# Patient Record
Sex: FEMALE | Race: OTHER | HISPANIC OR LATINO | Employment: FULL TIME | URBAN - METROPOLITAN AREA
[De-identification: names, ages, dates, MRNs, and addresses within clinical notes are randomized per-mention and may not be internally consistent; named-entity substitution may affect disease eponyms.]

---

## 2020-01-20 ENCOUNTER — OFFICE VISIT (OUTPATIENT)
Dept: FAMILY MEDICINE CLINIC | Facility: CLINIC | Age: 20
End: 2020-01-20
Payer: COMMERCIAL

## 2020-01-20 VITALS
TEMPERATURE: 98.2 F | SYSTOLIC BLOOD PRESSURE: 102 MMHG | WEIGHT: 157.4 LBS | RESPIRATION RATE: 16 BRPM | DIASTOLIC BLOOD PRESSURE: 70 MMHG | HEART RATE: 87 BPM | OXYGEN SATURATION: 98 % | HEIGHT: 60 IN | BODY MASS INDEX: 30.9 KG/M2

## 2020-01-20 DIAGNOSIS — Z00.00 ANNUAL PHYSICAL EXAM: Primary | ICD-10-CM

## 2020-01-20 DIAGNOSIS — H54.7 POOR VISION: ICD-10-CM

## 2020-01-20 DIAGNOSIS — J45.30 MILD PERSISTENT ASTHMA WITHOUT COMPLICATION: ICD-10-CM

## 2020-01-20 DIAGNOSIS — Z13.6 SCREENING FOR CARDIOVASCULAR CONDITION: ICD-10-CM

## 2020-01-20 DIAGNOSIS — E66.09 CLASS 1 OBESITY DUE TO EXCESS CALORIES WITHOUT SERIOUS COMORBIDITY WITH BODY MASS INDEX (BMI) OF 30.0 TO 30.9 IN ADULT: ICD-10-CM

## 2020-01-20 DIAGNOSIS — Z13.31 SCREENING FOR DEPRESSION: ICD-10-CM

## 2020-01-20 PROCEDURE — 3008F BODY MASS INDEX DOCD: CPT | Performed by: FAMILY MEDICINE

## 2020-01-20 PROCEDURE — 99202 OFFICE O/P NEW SF 15 MIN: CPT | Performed by: FAMILY MEDICINE

## 2020-01-20 RX ORDER — ALBUTEROL SULFATE 1.25 MG/3ML
1 SOLUTION RESPIRATORY (INHALATION) EVERY 6 HOURS PRN
Qty: 30 VIAL | Refills: 2 | Status: SHIPPED | OUTPATIENT
Start: 2020-01-20

## 2020-01-20 RX ORDER — FLUTICASONE PROPIONATE 110 UG/1
2 AEROSOL, METERED RESPIRATORY (INHALATION) 2 TIMES DAILY
Qty: 1 INHALER | Refills: 5 | Status: SHIPPED | OUTPATIENT
Start: 2020-01-20 | End: 2020-03-04

## 2020-01-20 RX ORDER — ALBUTEROL SULFATE 90 UG/1
2 AEROSOL, METERED RESPIRATORY (INHALATION) EVERY 6 HOURS PRN
COMMUNITY
End: 2020-01-20 | Stop reason: SDUPTHER

## 2020-01-20 RX ORDER — ALBUTEROL SULFATE 1.25 MG/3ML
1 SOLUTION RESPIRATORY (INHALATION) EVERY 6 HOURS PRN
COMMUNITY
End: 2020-01-20 | Stop reason: SDUPTHER

## 2020-01-20 NOTE — PROGRESS NOTES
[address] [facility]    NAME: Desi Jama  AGE: 20 y o  SEX: female  : 2000     DATE: 2020     Assessment and Plan:     Patient is an obese 19 yo F with untreated mild persistent asthma that is exercise-induced  Patient is already pre-treating herself prior to exercise with albuterol inhaler  Patient was started on Fluticasone; instructed her to rinse mouth after every use  Discussed about different lifestyle interventions to lose weight, including [place] Healthy Eating Plate, portion size, and eating earlier in the day  F/u on CMP and Lipid Panel     Problem List Items Addressed This Visit     None      Visit Diagnoses     Annual physical exam    -  Primary    Poor vision        Relevant Orders    Ambulatory referral to Ophthalmology    Mild persistent asthma without complication        Relevant Medications    albuterol (ACCUNEB) 1 25 MG/3ML nebulizer solution    Albuterol Sulfate (PROAIR RESPICLICK) 623 (90 Base) MCG/ACT AEPB    fluticasone (FLOVENT HFA) 110 MCG/ACT inhaler    Class 1 obesity due to excess calories without serious comorbidity with body mass index (BMI) of 30 0 to 30 9 in adult        Relevant Orders    Comprehensive metabolic panel    Lipid Panel with Direct LDL reflex          Immunizations and preventive care screenings were discussed with patient today  Appropriate education was printed on patient's after visit summary  Counseling:  · Dental Health: discussed importance of regular tooth brushing, flossing, and dental visits  BMI Counseling: Body mass index is 30 74 kg/m²  The BMI is above normal  Nutrition recommendations include decreasing portion sizes, encouraging healthy choices of fruits and vegetables, decreasing fast food intake, reducing intake of saturated and trans fat and reducing intake of cholesterol  Exercise recommendations include exercising 3-5 times per week              Chief Complaint:     Chief Complaint   Patient presents with   [facility] ,no concern,no food  or drug allergies  needs refills  History of Present Illness:     Adult Annual Physical   Patient here for a comprehensive physical exam     The New Patient is an obese 21 yo F with mild persistent asthma presenting for [facility]  Patient has no health concerns  No recent injury, hospitalizations, or health concerns; mainly wants her albuterol medication refill  Patient's asthma was diagnosed 10 years ago, during his one and only exacerbation requiring emergency breathing treatment  Since then, the patient reports her asthma has been under control with rescue albuterol inhaler alone, which she uses daily before exercising  Patient reports requiring albuterol at night weekly  Patient is allergic to her own pets (cats and 2 dogs), which she treats with Zyrtec and occasionally Benadryl at night time  Patient denied any recent emergency room visits or hospitalizations for asthma exacerbation  Patient has been concerned about her weight, and has started going to the gym after work with her boyfriend "at least 5x/week " Patient reports her diet is largely standard  food during the weekday at home and she eats out during the weekends  Patient understands is trying to cut out white rice from her diet  Patient does NOT eat breakfast, but stops eating after 8 PM      Patient denies drinking, smoking cigarettes, with an exception of smoking marijuana twice a week for "anxiety and low back pain " Patient is currently experiencing life stress through parent's divorce, "which should have happened 5 years ago," as per patient  Patient is usually does NOT get anxious or stressed out easily, and she denies depressive mood or anhedonia  Patient feels safe at work, home, and in her relationship with her current boyfriend       Patient's latest Gonorrhea/chlamydia screen few months ago was negative  Diet and Physical Activity  · Diet/Nutrition:  food, eat out on weekends  Cook at home  Planning on cutting down on rice  · Exercise: 5-7 times a week on average  Depression Screening  Mood - no depression or anhedonia   PHQ-9 Depression Screening    PHQ-9:    Frequency of the following problems over the past two weeks:       Little interest or pleasure in doing things:  0 - not at all  Feeling down, depressed, or hopeless:  0 - not at all  PHQ-2 Score:  0       General Health  · Sleep: gets 4-6 hours of sleep on average  · Hearing: normal - bilateral   · Vision: most recent eye exam >1 year ago and prescription glasses every 2 years   · Dental: no dental visits for >1 year  Have new dental insurance  Will have regular cleaning     /GYN Health  · Last menstrual period: 1/13/2020  · Irregular and cramping treated with OCPs  Patient states the menses is still irregular, but it is NOT impairing her daily functions  · Contraceptive method: oral contraceptives  · History of STDs?: no      Review of Systems:     Review of Systems   HENT: Negative for hearing loss  Respiratory: Negative for shortness of breath and wheezing  Cardiovascular: Negative for chest pain  Gastrointestinal: Negative for abdominal pain  Genitourinary: Positive for menstrual problem (irregular menses with cramping treated with OCP)  Musculoskeletal: Negative for back pain (self-treating with marijuana twice a week)  Neurological: Negative for numbness  Psychiatric/Behavioral: Positive for sleep disturbance (poor sleep due to boyfriend's snoring)  Negative for dysphoric mood  The patient is not nervous/anxious  All other systems reviewed and are negative       Past Medical History:     Past Medical History:   Diagnosis Date    Asthma       Past Surgical History:     Past Surgical History:   Procedure Laterality Date    WISDOM TOOTH EXTRACTION  09/2018      Social History:     Social History Socioeconomic History    Marital status: Single     Spouse name: None    Number of children: None    Years of education: None    Highest education level: 12th grade   Occupational History    None   Social Needs    Financial resource strain: Not hard at all   Venu-Osbaldo insecurity:     Worry: Never true     Inability: Never true   [employer] needs:     Medical: Patient refused     Non-medical: Patient refused   Tobacco Use    Smoking status: Never Smoker    Smokeless tobacco: Never Used   Substance and Sexual Activity    Alcohol use: Yes     Alcohol/week: 2 0 standard drinks     Types: 2 Glasses of wine per week     Frequency: 2-4 times a month     Drinks per session: 1 or 2    Drug use: Yes     Frequency: 2 0 times per week     Types: Marijuana     Comment: for back pain twice a wk   Sexual activity: Yes     Partners: Male     Birth control/protection: Condom Male, OCP   Lifestyle    Physical activity:     Days per week: 5 days     Minutes per session: 60 min    Stress:  To some extent   Relationships    Social connections:     Talks on phone: More than three times a week     Gets together: More than three times a week     Attends [employer] service: Never     Active member of club or organization: No     Attends meetings of clubs or organizations: Patient refused     Relationship status: Never     Intimate partner violence:     Fear of current or ex partner: Patient refused     Emotionally abused: Patient refused     Physically abused: Patient refused     Forced sexual activity: Patient refused   Other Topics Concern    None   Social History Narrative    None      Family History:     Family History   Problem Relation Age of Onset    Depression Mother     Mental illness Mother     Dementia Maternal Grandmother     Dementia Maternal Grandfather     Dementia Paternal Grandmother     Dementia Paternal Grandfather       Current Medications:     Current Outpatient Medications Medication Sig Dispense Refill    albuterol (ACCUNEB) 1 25 MG/3ML nebulizer solution Take 3 mL (1 25 mg total) by nebulization every 6 (six) hours as needed for wheezing 30 vial 2    Albuterol Sulfate (PROAIR RESPICLICK) 584 (90 Base) MCG/ACT AEPB Inhale 1 Inhaler every 6 (six) hours as needed (before exercise) 1 each 10    fluticasone (FLOVENT HFA) 110 MCG/ACT inhaler Inhale 2 puffs 2 (two) times a day Rinse mouth after use  1 Inhaler 5     No current facility-administered medications for this visit  Allergies:     No Known Allergies   Physical Exam:     /70 (BP Location: Left arm, Patient Position: Sitting, Cuff Size: Standard)   Pulse 87   Temp 98 2 °F (36 8 °C) (Tympanic)   Resp 16   Ht 5' (1 524 m)   Wt 71 4 kg (157 lb 6 4 oz)   SpO2 98%   BMI 30 74 kg/m²     Physical Exam   Constitutional: She is oriented to person, place, and time  She appears well-developed and well-nourished  No distress  HENT:   Right Ear: Tympanic membrane normal    Left Ear: Tympanic membrane normal    Mouth/Throat: Oropharynx is clear and moist  No oropharyngeal exudate  Eyes: Pupils are equal, round, and reactive to light  Conjunctivae are normal    Cardiovascular: Normal rate  Pulmonary/Chest: Effort normal and breath sounds normal  No respiratory distress  She has no wheezes  Lymphadenopathy:     She has no cervical adenopathy  Neurological: She is alert and oriented to person, place, and time  No cranial nerve deficit  She exhibits normal muscle tone  Skin: Skin is warm  She is not diaphoretic  Psychiatric: She has a normal mood and affect  Her behavior is normal    Vitals reviewed        Emmanuel Dolan MD   [address] [place] [address]

## 2020-01-20 NOTE — PATIENT INSTRUCTIONS

## 2020-01-27 ENCOUNTER — TELEPHONE (OUTPATIENT)
Dept: FAMILY MEDICINE CLINIC | Facility: CLINIC | Age: 20
End: 2020-01-27

## 2020-01-27 DIAGNOSIS — J45.30 MILD PERSISTENT ASTHMA WITHOUT COMPLICATION: Primary | ICD-10-CM

## 2020-01-27 RX ORDER — ALBUTEROL SULFATE 90 UG/1
2 AEROSOL, METERED RESPIRATORY (INHALATION) EVERY 6 HOURS PRN
Qty: 2 INHALER | Refills: 5 | Status: SHIPPED | OUTPATIENT
Start: 2020-01-27 | End: 2021-05-13 | Stop reason: SDUPTHER

## 2020-01-27 NOTE — TELEPHONE ENCOUNTER
Pt said pharmacy told her albuterol respiclick is not covered under plan and that she needs albuterol HFA

## 2020-03-04 DIAGNOSIS — J45.30 MILD PERSISTENT ASTHMA WITHOUT COMPLICATION: Primary | ICD-10-CM

## 2020-03-04 RX ORDER — DEXAMETHASONE 4 MG/1
TABLET ORAL
Qty: 12 INHALER | Refills: 5 | Status: SHIPPED | OUTPATIENT
Start: 2020-03-04 | End: 2021-05-13 | Stop reason: SDUPTHER

## 2020-05-08 ENCOUNTER — TELEMEDICINE (OUTPATIENT)
Dept: FAMILY MEDICINE CLINIC | Facility: CLINIC | Age: 20
End: 2020-05-08
Payer: COMMERCIAL

## 2020-05-08 DIAGNOSIS — R68.84 JAW PAIN: Primary | ICD-10-CM

## 2020-05-08 PROCEDURE — 99214 OFFICE O/P EST MOD 30 MIN: CPT | Performed by: FAMILY MEDICINE

## 2020-09-08 DIAGNOSIS — Z30.9 ENCOUNTER FOR CONTRACEPTIVE MANAGEMENT, UNSPECIFIED TYPE: Primary | ICD-10-CM

## 2020-10-06 DIAGNOSIS — Z30.9 ENCOUNTER FOR CONTRACEPTIVE MANAGEMENT, UNSPECIFIED TYPE: ICD-10-CM

## 2020-10-06 RX ORDER — NORETHINDRONE ACETATE AND ETHINYL ESTRADIOL, ETHINYL ESTRADIOL AND FERROUS FUMARATE 1MG-10(24)
KIT ORAL
Qty: 28 TABLET | Refills: 1 | Status: SHIPPED | OUTPATIENT
Start: 2020-10-06 | End: 2021-05-13 | Stop reason: SDUPTHER

## 2021-02-01 ENCOUNTER — TELEMEDICINE (OUTPATIENT)
Dept: FAMILY MEDICINE CLINIC | Facility: CLINIC | Age: 21
End: 2021-02-01

## 2021-02-01 DIAGNOSIS — F41.9 ANXIETY: Primary | ICD-10-CM

## 2021-02-01 PROCEDURE — 99202 OFFICE O/P NEW SF 15 MIN: CPT | Performed by: FAMILY MEDICINE

## 2021-02-01 NOTE — PROGRESS NOTES
Virtual Regular Visit      Assessment/Plan:    Problem List Items Addressed This Visit     None      Visit Diagnoses     Anxiety    -  Primary               Reason for visit is   Chief Complaint   Patient presents with    New Patient Visit     ANXIETY/SLEEP ISSUES        Encounter provider Anam Perez MD    Provider located at [address] [place] [address]      Recent Visits  No visits were found meeting these conditions  Showing recent visits within past 7 days and meeting all other requirements     Today's Visits  Date Type Provider Dept   02/01/21 Telemedicine Anam Perez MD [facility] [address] [facility] today's visits and meeting all other requirements     Future Appointments  No visits were found meeting these conditions  Showing future appointments within next 150 days and meeting all other requirements        The patient was identified by name and date of birth  Desi Jama was informed that this is a telemedicine visit and that the visit is being conducted through [facility] and patient was informed that this is not a secure, HIPAA-compliant platform  She agrees to proceed     My office door was closed  No one else was in the room  She acknowledged consent and understanding of privacy and security of the video platform  The patient has agreed to participate and understands they can discontinue the visit at any time  Patient is aware this is a billable service  Subjective  Desi Jama is a 23 y o  female w/ long Hx anxiety and strong FHx anxiety         HPI     Past Medical History:   Diagnosis Date    Asthma        Past Surgical History:   Procedure Laterality Date    WISDOM TOOTH EXTRACTION  09/2018       Current Outpatient Medications   Medication Sig Dispense Refill    albuterol (ACCUNEB) 1 25 MG/3ML nebulizer solution Take 3 mL (1 25 mg total) by nebulization every 6 (six) hours as needed for wheezing 30 vial 2    albuterol (VENTOLIN HFA) 90 mcg/act inhaler Inhale 2 puffs every 6 (six) hours as needed for wheezing 2 Inhaler 5    FLOVENT  MCG/ACT inhaler INHALE 2 PUFFS 2 (TWO) TIMES A DAY RINSE MOUTH AFTER USE  12 Inhaler 5    Lo Loestrin Fe 1 MG-10 MCG / 10 MCG TABS TAKE 1 TABLET BY MOUTH EVERY DAY 28 tablet 1     No current facility-administered medications for this visit  No Known Allergies    Review of Systems   Constitutional: Negative for fatigue, fever and unexpected weight change  HENT: Negative for congestion, sinus pain and sore throat  Eyes: Negative for visual disturbance  Respiratory: Negative for shortness of breath and wheezing  Cardiovascular: Negative for chest pain and palpitations  Gastrointestinal: Negative for abdominal pain, nausea and vomiting  Musculoskeletal: Negative  Negative for arthralgias and myalgias  Neurological: Negative for syncope, weakness and numbness  Psychiatric/Behavioral: Negative for confusion, dysphoric mood and suicidal ideas  The patient is nervous/anxious  OCD       Video Exam    There were no vitals filed for this visit  Physical Exam  Pulmonary:      Effort: Pulmonary effort is normal           I spent 15 minutes directly with the patient during this visit      VIRTUAL VISIT DISCLAIMER    Rosalino Daniel acknowledges that she has consented to an online visit or consultation  She understands that the online visit is based solely on information provided by her, and that, in the absence of a face-to-face physical evaluation by the physician, the diagnosis she receives is both limited and provisional in terms of accuracy and completeness  This is not intended to replace a full medical face-to-face evaluation by the physician  Rosalino Daniel understands and accepts these terms

## 2021-05-13 ENCOUNTER — OFFICE VISIT (OUTPATIENT)
Dept: FAMILY MEDICINE CLINIC | Facility: CLINIC | Age: 21
End: 2021-05-13
Payer: COMMERCIAL

## 2021-05-13 VITALS
WEIGHT: 157.8 LBS | BODY MASS INDEX: 30.98 KG/M2 | HEART RATE: 102 BPM | SYSTOLIC BLOOD PRESSURE: 110 MMHG | HEIGHT: 60 IN | DIASTOLIC BLOOD PRESSURE: 70 MMHG | OXYGEN SATURATION: 97 % | TEMPERATURE: 98.2 F

## 2021-05-13 DIAGNOSIS — Z00.00 WELLNESS EXAMINATION: ICD-10-CM

## 2021-05-13 DIAGNOSIS — J45.40 MODERATE PERSISTENT ASTHMA, UNSPECIFIED WHETHER COMPLICATED: Primary | ICD-10-CM

## 2021-05-13 DIAGNOSIS — Z30.41 ORAL CONTRACEPTIVE PILL SURVEILLANCE: ICD-10-CM

## 2021-05-13 DIAGNOSIS — J45.30 MILD PERSISTENT ASTHMA WITHOUT COMPLICATION: ICD-10-CM

## 2021-05-13 DIAGNOSIS — Z30.9 ENCOUNTER FOR CONTRACEPTIVE MANAGEMENT, UNSPECIFIED TYPE: ICD-10-CM

## 2021-05-13 PROCEDURE — 3725F SCREEN DEPRESSION PERFORMED: CPT | Performed by: FAMILY MEDICINE

## 2021-05-13 PROCEDURE — 99213 OFFICE O/P EST LOW 20 MIN: CPT | Performed by: FAMILY MEDICINE

## 2021-05-13 PROCEDURE — 99395 PREV VISIT EST AGE 18-39: CPT | Performed by: FAMILY MEDICINE

## 2021-05-13 RX ORDER — DEXAMETHASONE 4 MG/1
1 TABLET ORAL 2 TIMES DAILY
Qty: 12 G | Refills: 5 | Status: SHIPPED | OUTPATIENT
Start: 2021-05-13 | End: 2021-10-12

## 2021-05-13 RX ORDER — NORETHINDRONE ACETATE AND ETHINYL ESTRADIOL, ETHINYL ESTRADIOL AND FERROUS FUMARATE 1MG-10(24)
1 KIT ORAL DAILY
Qty: 28 TABLET | Refills: 3 | Status: SHIPPED | OUTPATIENT
Start: 2021-05-13 | End: 2021-08-03

## 2021-05-13 RX ORDER — ALBUTEROL SULFATE 90 UG/1
2 AEROSOL, METERED RESPIRATORY (INHALATION) EVERY 6 HOURS PRN
Qty: 18 G | Refills: 5 | Status: SHIPPED | OUTPATIENT
Start: 2021-05-13 | End: 2021-12-13

## 2021-05-13 NOTE — PROGRESS NOTES
HPI:  Kerire Rivera is a 23 y o  female here for her yearly health maintenance exam    Patient Active Problem List   Diagnosis    Jaw pain    Asthma     Past Medical History:   Diagnosis Date    Asthma        1  Advanced Directive: n     2  Durable Power of  for Healthcare: n     3  Social History:           Drug and alcohol History: n                  4  Immunizations up to date: y                 Lifestyle:                           Healthy Diet:y                          Alcohol Use:y                          Tobacco Use:n                          Regular exercise:y                          Weight concerns:n                               5  Over the past 2 weeks, how often have you been bothered by the following:              Little interest or pleasure in doing things:n              Felling down, depressed or hopeless:n       Current Outpatient Medications   Medication Sig Dispense Refill    albuterol (ACCUNEB) 1 25 MG/3ML nebulizer solution Take 3 mL (1 25 mg total) by nebulization every 6 (six) hours as needed for wheezing 30 vial 2    albuterol (VENTOLIN HFA) 90 mcg/act inhaler Inhale 2 puffs every 6 (six) hours as needed for wheezing 2 Inhaler 5    FLOVENT  MCG/ACT inhaler INHALE 2 PUFFS 2 (TWO) TIMES A DAY RINSE MOUTH AFTER USE  12 Inhaler 5    Lo Loestrin Fe 1 MG-10 MCG / 10 MCG TABS TAKE 1 TABLET BY MOUTH EVERY DAY 28 tablet 1     No current facility-administered medications for this visit        No Known Allergies  Immunization History   Administered Date(s) Administered    DTaP,unspecified 2000, 2000, 01/03/2000, 08/30/2000, 05/29/2003    HPV 08/23/2010    HPV Quadrivalent 09/08/2014, 04/02/2015    Hepatitis A 02/10/2006, 04/06/2007    Hepatitis B 03/11/2006, 04/06/2007, 07/08/2009, 08/08/2009    HiB [birth year], 08/30/2000    INFLUENZA 10/04/2019    IPV 2000, 2000, 01/03/2000, 05/29/2003    MMR 06/02/2000, 05/29/2003    Meningococcal MCV4, Unspecified 08/23/2010    Meningococcal MCV4P 10/06/2015    Pneumococcal Conjugate 13-Valent 02/10/2006    Pneumococcal Conjugate PCV 7 08/30/2000, 11/29/2000    Tdap 08/23/2010, 05/13/2017    Varicella 04/06/2007, 05/18/2007       Patient Care Team:  Nimco Ramos MD as PCP - General (Family Medicine)    Review of Systems   Constitutional: Negative for appetite change, chills, diaphoresis and fever  HENT: Negative for ear pain, rhinorrhea, sinus pressure and sore throat  Eyes: Negative for discharge, redness and itching  Respiratory: Negative for cough, shortness of breath and wheezing  Cardiovascular: Negative for chest pain and palpitations  Gastrointestinal: Negative for abdominal pain, diarrhea, nausea and vomiting  Physical Exam :  Physical Exam  Vitals signs and nursing note reviewed  Constitutional:       General: She is not in acute distress  Appearance: She is well-developed  She is not diaphoretic  HENT:      Right Ear: Tympanic membrane, ear canal and external ear normal  Tympanic membrane is not injected  Left Ear: Tympanic membrane, ear canal and external ear normal  Tympanic membrane is not injected  Nose: Nose normal    Eyes:      General:         Right eye: No discharge  Left eye: No discharge  Conjunctiva/sclera: Conjunctivae normal       Pupils: Pupils are equal, round, and reactive to light  Neck:      Musculoskeletal: Normal range of motion and neck supple  Thyroid: No thyromegaly  Cardiovascular:      Rate and Rhythm: Normal rate and regular rhythm  Heart sounds: Normal heart sounds  No murmur  Pulmonary:      Effort: Pulmonary effort is normal  No respiratory distress  Breath sounds: Normal breath sounds  No wheezing  Lymphadenopathy:      Cervical: No cervical adenopathy  Assessment and Plan:  1  Moderate persistent asthma, unspecified whether complicated     2   Oral contraceptive pill surveillance 3  Wellness examination         Health Maintenance Due   Topic Date Due    COVID-19 Vaccine (1) Never done    HIV Screening  Never done    Chlamydia Screening  Never done    BMI: Adult  Never done    Annual Physical  Never done    Cervical Cancer Screening  Never done

## 2021-05-13 NOTE — PROGRESS NOTES
[facility]        NAME: Riley Workman is a 23 y o  female  : 2000    MRN: [id number]  DATE: May 13, 2021  TIME: 9:32 AM    Assessment and Plan   Moderate persistent asthma, unspecified whether complicated [H00 13]  1  Moderate persistent asthma, unspecified whether complicated     2  Oral contraceptive pill surveillance     3  Wellness examination           Patient Instructions     Patient Instructions   See meds--sched PAP          Chief Complaint     Chief Complaint   Patient presents with    Physical Exam         History of Present Illness       Est care      Review of Systems   Review of Systems   Constitutional: Negative for fatigue, fever and unexpected weight change  HENT: Negative for congestion, sinus pain and sore throat  Eyes: Negative for visual disturbance  Respiratory: Negative for shortness of breath and wheezing  Cardiovascular: Negative for chest pain and palpitations  Gastrointestinal: Negative for abdominal pain, nausea and vomiting  Musculoskeletal: Negative  Negative for arthralgias and myalgias  Neurological: Negative for syncope, weakness and numbness  Psychiatric/Behavioral: Negative  Negative for confusion, dysphoric mood and suicidal ideas           Current Medications       Current Outpatient Medications:     albuterol (ACCUNEB) 1 25 MG/3ML nebulizer solution, Take 3 mL (1 25 mg total) by nebulization every 6 (six) hours as needed for wheezing, Disp: 30 vial, Rfl: 2    albuterol (VENTOLIN HFA) 90 mcg/act inhaler, Inhale 2 puffs every 6 (six) hours as needed for wheezing, Disp: 2 Inhaler, Rfl: 5    FLOVENT  MCG/ACT inhaler, INHALE 2 PUFFS 2 (TWO) TIMES A DAY RINSE MOUTH AFTER USE , Disp: 12 Inhaler, Rfl: 5    Lo Loestrin Fe 1 MG-10 MCG / 10 MCG TABS, TAKE 1 TABLET BY MOUTH EVERY DAY, Disp: 28 tablet, Rfl: 1    Current Allergies     Allergies as of 2021    (No Known Allergies)            The following portions of the patient's history were reviewed and updated as appropriate: allergies, current medications, past family history, past medical history, past social history, past surgical history and problem list      Past Medical History:   Diagnosis Date    Asthma        Past Surgical History:   Procedure Laterality Date    WISDOM TOOTH EXTRACTION  09/2018       Family History   Problem Relation Age of Onset    Depression Mother     Mental illness Mother     Asthma Mother     Dementia Maternal Grandmother     Dementia Maternal Grandfather     Dementia Paternal Grandmother     Dementia Paternal Grandfather     Asthma Paternal Aunt     Asthma Paternal Uncle          Medications have been verified  Objective   /70 (BP Location: Left arm, Patient Position: Sitting, Cuff Size: Standard)   Pulse 102   Temp 98 2 °F (36 8 °C) (Oral)   Ht 5' (1 524 m)   Wt 71 6 kg (157 lb 12 8 oz)   SpO2 97%   BMI 30 82 kg/m²        Physical Exam     Physical Exam  Constitutional:       Appearance: She is well-developed  HENT:      Right Ear: Ear canal normal  Tympanic membrane is not injected  Left Ear: Ear canal normal  Tympanic membrane is not injected  Nose: Nose normal    Eyes:      General:         Right eye: No discharge  Left eye: No discharge  Conjunctiva/sclera: Conjunctivae normal       Pupils: Pupils are equal, round, and reactive to light  Neck:      Musculoskeletal: Normal range of motion and neck supple  Thyroid: No thyromegaly  Cardiovascular:      Rate and Rhythm: Normal rate and regular rhythm  Heart sounds: Normal heart sounds  No murmur  Pulmonary:      Effort: Pulmonary effort is normal  No respiratory distress  Breath sounds: Normal breath sounds  No wheezing  Abdominal:      General: Bowel sounds are normal  There is no distension  Palpations: Abdomen is soft  Tenderness: There is no abdominal tenderness     Musculoskeletal: Normal range of motion  Lymphadenopathy:      Cervical: No cervical adenopathy  Skin:     General: Skin is warm and dry  Neurological:      Mental Status: She is alert and oriented to person, place, and time  She is not disoriented  Sensory: No sensory deficit  Gait: Gait normal       Deep Tendon Reflexes: Reflexes are normal and symmetric  Psychiatric:         Speech: Speech normal          Behavior: Behavior normal          Thought Content:  Thought content normal          Judgment: Judgment normal

## 2021-06-07 ENCOUNTER — ANNUAL EXAM (OUTPATIENT)
Dept: FAMILY MEDICINE CLINIC | Facility: CLINIC | Age: 21
End: 2021-06-07
Payer: COMMERCIAL

## 2021-06-07 VITALS
HEIGHT: 60 IN | TEMPERATURE: 100 F | HEART RATE: 91 BPM | DIASTOLIC BLOOD PRESSURE: 64 MMHG | WEIGHT: 159 LBS | SYSTOLIC BLOOD PRESSURE: 102 MMHG | BODY MASS INDEX: 31.22 KG/M2 | OXYGEN SATURATION: 97 %

## 2021-06-07 DIAGNOSIS — Z02.4 DRIVER'S PERMIT PHYSICAL EXAMINATION: ICD-10-CM

## 2021-06-07 DIAGNOSIS — Z01.419 ENCOUNTER FOR GYNECOLOGICAL EXAMINATION WITHOUT ABNORMAL FINDING: Primary | ICD-10-CM

## 2021-06-07 PROCEDURE — 1036F TOBACCO NON-USER: CPT | Performed by: NURSE PRACTITIONER

## 2021-06-07 PROCEDURE — 3008F BODY MASS INDEX DOCD: CPT | Performed by: NURSE PRACTITIONER

## 2021-06-07 PROCEDURE — S0612 ANNUAL GYNECOLOGICAL EXAMINA: HCPCS | Performed by: NURSE PRACTITIONER

## 2021-06-07 PROCEDURE — 99213 OFFICE O/P EST LOW 20 MIN: CPT | Performed by: NURSE PRACTITIONER

## 2021-06-07 NOTE — PROGRESS NOTES
[facility]        NAME: Helena Crouch is a 24 y o  female  : 2000    MRN: [id number]  DATE: 2021  TIME: 5:25 PM    Assessment and Plan   Encounter for gynecological examination without abnormal finding [[id number] [id number]]  1  Encounter for gynecological examination without abnormal finding     2  's permit physical examination           Patient Instructions     Patient Instructions   Pap collected and sent to lab  's permit formed completed and returned to patient  Chief Complaint     Chief Complaint   Patient presents with    Gynecologic Exam         History of Present Illness       Here for routine gyn exam  LMP 21  taking oral birth control  No problems or concerns  Review of Systems   Review of Systems   Constitutional: Negative for activity change, appetite change, chills, diaphoresis, fatigue, fever and unexpected weight change  Respiratory: Negative for chest tightness, shortness of breath and wheezing  Cardiovascular: Negative for chest pain and palpitations  Gastrointestinal: Negative for abdominal distention, abdominal pain, constipation, diarrhea and nausea  Genitourinary: Negative for difficulty urinating, dyspareunia, dysuria, flank pain, frequency, menstrual problem, pelvic pain, urgency, vaginal bleeding, vaginal discharge and vaginal pain  Neurological: Negative for dizziness, weakness and headaches  Psychiatric/Behavioral: Negative for behavioral problems, dysphoric mood, sleep disturbance and suicidal ideas  The patient is not nervous/anxious            Current Medications       Current Outpatient Medications:     albuterol (ACCUNEB) 1 25 MG/3ML nebulizer solution, Take 3 mL (1 25 mg total) by nebulization every 6 (six) hours as needed for wheezing, Disp: 30 vial, Rfl: 2    albuterol (Ventolin HFA) 90 mcg/act inhaler, Inhale 2 puffs every 6 (six) hours as needed for wheezing, Disp: 18 g, Rfl: 5    fluticasone (Flovent HFA) 110 MCG/ACT inhaler, Inhale 1 puff 2 (two) times a day Rinse mouth after use , Disp: 12 g, Rfl: 5    Norethin-Eth Estrad-Fe Biphas (Lo Loestrin Fe) 1 MG-10 MCG / 10 MCG TABS, Take 1 tablet by mouth daily, Disp: 28 tablet, Rfl: 3    Current Allergies     Allergies as of 06/07/2021    (No Known Allergies)            The following portions of the patient's history were reviewed and updated as appropriate: allergies, current medications, past family history, past medical history, past social history, past surgical history and problem list      Past Medical History:   Diagnosis Date    Asthma        Past Surgical History:   Procedure Laterality Date    WISDOM TOOTH EXTRACTION  09/2018       Family History   Problem Relation Age of Onset    Depression Mother     Mental illness Mother     Asthma Mother     Dementia Maternal Grandmother     Dementia Maternal Grandfather     Dementia Paternal Grandmother     Dementia Paternal Grandfather     Asthma Paternal Aunt     Asthma Paternal Uncle          Medications have been verified  Objective   /64   Pulse 91   Temp 100 °F (37 8 °C) (Tympanic)   Ht 5' (1 524 m)   Wt 72 1 kg (159 lb)   SpO2 97%   BMI 31 05 kg/m²        Physical Exam     Physical Exam  Vitals signs and nursing note reviewed  Constitutional:       General: She is not in acute distress  Appearance: Normal appearance  She is well-developed  She is not diaphoretic  HENT:      Head: Normocephalic  Eyes:      Extraocular Movements: Extraocular movements intact  Pupils: Pupils are equal, round, and reactive to light  Neck:      Musculoskeletal: Normal range of motion and neck supple  No neck rigidity or muscular tenderness  Thyroid: No thyroid mass or thyromegaly  Vascular: No carotid bruit  Trachea: No tracheal deviation  Comments: Thyroid: no nodules  Cardiovascular:      Rate and Rhythm: Normal rate and regular rhythm        Heart sounds: No murmur  No friction rub  No gallop  Pulmonary:      Effort: Pulmonary effort is normal  No respiratory distress  Breath sounds: Normal breath sounds  No wheezing or rales  Chest:      Chest wall: No tenderness  Abdominal:      General: Bowel sounds are normal  There is no distension  Palpations: Abdomen is soft  There is no mass  Tenderness: There is no abdominal tenderness  There is no guarding or rebound  Genitourinary:     General: Normal vulva  Labia:         Right: No rash, tenderness, lesion or injury  Left: No rash, tenderness, lesion or injury  Vagina: Normal  No signs of injury and foreign body  No vaginal discharge, erythema, tenderness or bleeding  Cervix: No cervical motion tenderness, discharge or friability  Uterus: Not deviated, not enlarged, not fixed and not tender  Adnexa:         Right: No mass, tenderness or fullness  Left: No mass, tenderness or fullness  Rectum: Normal    Musculoskeletal: Normal range of motion  General: No tenderness or deformity  Lymphadenopathy:      Cervical: No cervical adenopathy  Skin:     General: Skin is warm and dry  Neurological:      Mental Status: She is alert and oriented to person, place, and time  Psychiatric:         Mood and Affect: Mood normal          Behavior: Behavior normal          Thought Content: Thought content normal          Judgment: Judgment normal      Breasts: breasts appear normal, no suspicious masses, no skin or nipple changes or axillary nodes  BMI Counseling: Body mass index is 31 05 kg/m²  The BMI is above normal  Nutrition recommendations include decreasing portion sizes, moderation in carbohydrate intake and increasing intake of lean protein  Exercise recommendations include exercising 3-5 times per week

## 2021-06-09 LAB
CLINICAL INFO: NORMAL
CYTO CVX: NORMAL
DATE PREVIOUS BX: NORMAL
LMP START DATE: NORMAL
SL AMB PREV. PAP:: NORMAL
SPECIMEN SOURCE CVX/VAG CYTO: NORMAL

## 2021-06-10 ENCOUNTER — TELEPHONE (OUTPATIENT)
Dept: FAMILY MEDICINE CLINIC | Facility: CLINIC | Age: 21
End: 2021-06-10

## 2021-06-10 NOTE — TELEPHONE ENCOUNTER
----- Message from [address] [place] [address],[address] sent at 6/9/2021  3:35 PM EDT -----  Call patient   Normal Pap

## 2021-08-03 DIAGNOSIS — Z30.9 ENCOUNTER FOR CONTRACEPTIVE MANAGEMENT, UNSPECIFIED TYPE: ICD-10-CM

## 2021-08-03 RX ORDER — NORETHINDRONE ACETATE AND ETHINYL ESTRADIOL, ETHINYL ESTRADIOL AND FERROUS FUMARATE 1MG-10(24)
KIT ORAL
Qty: 84 TABLET | Refills: 1 | Status: SHIPPED | OUTPATIENT
Start: 2021-08-03 | End: 2022-03-11

## 2021-10-12 DIAGNOSIS — J45.30 MILD PERSISTENT ASTHMA WITHOUT COMPLICATION: ICD-10-CM

## 2021-10-12 RX ORDER — DEXAMETHASONE 4 MG/1
1 TABLET ORAL 2 TIMES DAILY
Qty: 12 G | Refills: 1 | Status: SHIPPED | OUTPATIENT
Start: 2021-10-12 | End: 2022-02-23 | Stop reason: SDUPTHER

## 2021-11-02 ENCOUNTER — OFFICE VISIT (OUTPATIENT)
Dept: OBGYN CLINIC | Facility: CLINIC | Age: 21
End: 2021-11-02
Payer: COMMERCIAL

## 2021-11-02 VITALS
SYSTOLIC BLOOD PRESSURE: 106 MMHG | BODY MASS INDEX: 30.67 KG/M2 | DIASTOLIC BLOOD PRESSURE: 74 MMHG | HEIGHT: 60 IN | WEIGHT: 156.2 LBS

## 2021-11-02 DIAGNOSIS — N92.6 IRREGULAR MENSTRUAL BLEEDING: Primary | ICD-10-CM

## 2021-11-02 DIAGNOSIS — Z11.3 SCREENING EXAMINATION FOR STD (SEXUALLY TRANSMITTED DISEASE): ICD-10-CM

## 2021-11-02 LAB — SL AMB POCT URINE HCG: NEGATIVE

## 2021-11-02 PROCEDURE — 99203 OFFICE O/P NEW LOW 30 MIN: CPT | Performed by: PHYSICIAN ASSISTANT

## 2021-11-02 PROCEDURE — 87491 CHLMYD TRACH DNA AMP PROBE: CPT | Performed by: PHYSICIAN ASSISTANT

## 2021-11-02 PROCEDURE — 81025 URINE PREGNANCY TEST: CPT | Performed by: PHYSICIAN ASSISTANT

## 2021-11-02 PROCEDURE — 87591 N.GONORRHOEAE DNA AMP PROB: CPT | Performed by: PHYSICIAN ASSISTANT

## 2021-11-02 RX ORDER — CETIRIZINE HYDROCHLORIDE 10 MG/1
10 TABLET, CHEWABLE ORAL DAILY
COMMUNITY

## 2021-11-03 LAB
C TRACH DNA SPEC QL NAA+PROBE: NEGATIVE
N GONORRHOEA DNA SPEC QL NAA+PROBE: NEGATIVE

## 2021-11-08 ENCOUNTER — ULTRASOUND (OUTPATIENT)
Dept: OBGYN CLINIC | Facility: CLINIC | Age: 21
End: 2021-11-08
Payer: COMMERCIAL

## 2021-11-08 DIAGNOSIS — N93.9 ABNORMAL UTERINE BLEEDING (AUB): ICD-10-CM

## 2021-11-08 PROCEDURE — 76856 US EXAM PELVIC COMPLETE: CPT | Performed by: OBSTETRICS & GYNECOLOGY

## 2021-12-09 DIAGNOSIS — J45.30 MILD PERSISTENT ASTHMA WITHOUT COMPLICATION: ICD-10-CM

## 2021-12-13 RX ORDER — ALBUTEROL SULFATE 90 UG/1
AEROSOL, METERED RESPIRATORY (INHALATION)
Qty: 18 G | Refills: 5 | Status: SHIPPED | OUTPATIENT
Start: 2021-12-13 | End: 2022-08-04

## 2022-01-19 ENCOUNTER — OFFICE VISIT (OUTPATIENT)
Dept: URGENT CARE | Facility: CLINIC | Age: 22
End: 2022-01-19
Payer: COMMERCIAL

## 2022-01-19 VITALS
SYSTOLIC BLOOD PRESSURE: 140 MMHG | DIASTOLIC BLOOD PRESSURE: 80 MMHG | OXYGEN SATURATION: 99 % | BODY MASS INDEX: 30.23 KG/M2 | HEART RATE: 96 BPM | WEIGHT: 154 LBS | TEMPERATURE: 99.7 F | HEIGHT: 60 IN | RESPIRATION RATE: 18 BRPM

## 2022-01-19 DIAGNOSIS — B34.9 VIRAL SYNDROME: Primary | ICD-10-CM

## 2022-01-19 PROCEDURE — 99213 OFFICE O/P EST LOW 20 MIN: CPT | Performed by: PHYSICIAN ASSISTANT

## 2022-01-19 RX ORDER — BENZONATATE 100 MG/1
100 CAPSULE ORAL 3 TIMES DAILY PRN
Qty: 20 CAPSULE | Refills: 0 | Status: SHIPPED | OUTPATIENT
Start: 2022-01-19 | End: 2022-04-15

## 2022-01-19 RX ORDER — FLUTICASONE PROPIONATE 50 MCG
1 SPRAY, SUSPENSION (ML) NASAL DAILY
Qty: 16 G | Refills: 0 | Status: SHIPPED | OUTPATIENT
Start: 2022-01-19 | End: 2022-04-15

## 2022-01-19 NOTE — PATIENT INSTRUCTIONS
Viral Syndrome   WHAT YOU NEED TO KNOW:   Viral syndrome is a term used for symptoms of an infection caused by a virus  Viruses are spread easily from person to person through the air and on shared items  DISCHARGE INSTRUCTIONS:   Call your local emergency number (911 in the [facility]) or have someone else call if:   · You have a seizure  · You cannot be woken  · You have chest pain or trouble breathing  Return to the emergency department if:   · You have a stiff neck, a bad headache, and sensitivity to light  · You feel weak, dizzy, or confused  · You stop urinating or urinate a lot less than usual     · You cough up blood or thick yellow or green mucus  · You have severe abdominal pain or your abdomen is larger than usual     Call your doctor if:   · Your symptoms do not get better with treatment or get worse after 3 days  · You have a rash or ear pain  · You have burning when you urinate  · You have questions or concerns about your condition or care  Medicines: You may  need any of the following:  · Acetaminophen  decreases pain and fever  It is available without a doctor's order  Ask how much to take and how often to take it  Follow directions  Read the labels of all other medicines you are using to see if they also contain acetaminophen, or ask your doctor or pharmacist  Acetaminophen can cause liver damage if not taken correctly  Do not use more than 4 grams (4,000 milligrams) total of acetaminophen in one day  · NSAIDs , such as ibuprofen, help decrease swelling, pain, and fever  NSAIDs can cause stomach bleeding or kidney problems in certain people  If you take blood thinner medicine, always ask your healthcare provider if NSAIDs are safe for you  Always read the medicine label and follow directions  · Cold medicine  helps decrease swelling, control a cough, and relieve chest or nasal congestion  · Saline nasal spray  helps decrease nasal congestion       · Take your medicine as directed  Contact your healthcare provider if you think your medicine is not helping or if you have side effects  Tell him of her if you are allergic to any medicine  Keep a list of the medicines, vitamins, and herbs you take  Include the amounts, and when and why you take them  Bring the list or the pill bottles to follow-up visits  Carry your medicine list with you in case of an emergency  Manage your symptoms:   · Drink liquids as directed to prevent dehydration  Ask how much liquid to drink each day and which liquids are best for you  Ask if you should drink an oral rehydration solution (ORS)  An ORS has the right amounts of water, salts, and sugar you need to replace body fluids  This may help prevent dehydration caused by vomiting or diarrhea  Do not drink liquids with caffeine  Liquids with caffeine can make dehydration worse  · Get plenty of rest to help your body heal   Take naps throughout the day  Ask your healthcare provider when you can return to work and your normal activities  · Use a cool mist humidifier to help you breathe easier  Ask your healthcare provider how to use a cool mist humidifier  · Eat honey or use cough drops for a sore throat  Cough drops are available without a doctor's order  Follow directions for taking cough drops  · Do not smoke or be close to anyone who is smoking  Nicotine and other chemicals in cigarettes and cigars can cause lung damage  Smoking can also delay healing  Ask your healthcare provider for information if you currently smoke and need help to quit  E-cigarettes or smokeless tobacco still contain nicotine  Talk to your healthcare provider before you use these products  Prevent the spread of germs:       · Wash your hands often  Wash your hands several times each day  Wash after you use the bathroom, change a child's diaper, and before you prepare or eat food  Use soap and water every time   Rub your soapy hands together, lacing your fingers  Wash the front and back of your hands, and in between your fingers  Use the fingers of one hand to scrub under the fingernails of the other hand  Wash for at least 20 seconds  Rinse with warm, running water for several seconds  Then dry your hands with a clean towel or paper towel  Use hand  that contains alcohol if soap and water are not available  Do not touch your eyes, nose, or mouth without washing your hands first          · Cover a sneeze or cough  Use a tissue that covers your mouth and nose  Throw the tissue away in a trash can right away  Use the bend of your arm if a tissue is not available  Wash your hands well with soap and water or use a hand   · Stay away from others while you are sick  Avoid crowds as much as possible  · Ask about vaccines you may need  Talk to your healthcare provider about your vaccine history  He or she will tell you which vaccines you need, and when to get them  ? Get the influenza (flu) vaccine as soon as recommended each year  The flu vaccine is available starting in September or October  Flu viruses change, so it is important to get a flu vaccine every year  ? Get the pneumonia vaccine if recommended  This vaccine is usually recommended every 5 years  Your provider will tell you when to get this vaccine, if needed  Follow up with your doctor as directed:  Write down your questions so you remember to ask them during your visits  © Copyright [employer] 2021 Information is for End User's use only and may not be sold, redistributed or otherwise used for commercial purposes  All illustrations and images included in CareNotes® are the copyrighted property of A D A M , Inc  or Cecilia Prescott  The above information is an  only  It is not intended as medical advice for individual conditions or treatments   Talk to your doctor, nurse or pharmacist before following any medical regimen to see if it is safe and effective for you

## 2022-01-19 NOTE — PROGRESS NOTES
[facility]        NAME: Severo Giovanni is a 24 y o  female  : 2000    MRN: [id number]  DATE: 2022  TIME: 11:33 AM    Assessment and Plan   Viral syndrome [B34 9]  1  Viral syndrome  fluticasone (FLONASE) 50 mcg/act nasal spray    benzonatate (TESSALON PERLES) 100 mg capsule         Patient Instructions       Follow up with PCP in 3-5 days  Proceed to  ER if symptoms worsen  Chief Complaint     Chief Complaint   Patient presents with    Sinusitis     nose and eyes swollen for 2 days    Wound Infection     on left nipple from body piercing that was done 2 years ago  A month ago started with pain and drainage  History of Present Illness       Patient is a 50-year-old female presents to the office complaining off his a congestion sinus pressure for the last 3 days  Patient has not had any fevers is tolerating p o  with no nausea vomiting diarrhea  Patient is not want a COVID testing at this time  Patient has some clear nasal discharge and congestion  Patient is also complaining of a irritation to her left nipple secondary to piercings  Patient states symptoms have intermittently been going on since summer of last year  URI   This is a new problem  Episode onset: 3 days  The problem has been unchanged  There has been no fever  Associated symptoms include congestion, a rash, rhinorrhea and sinus pain  Pertinent negatives include no abdominal pain, chest pain, coughing, diarrhea, dysuria, headaches, joint pain, joint swelling, nausea, neck pain, plugged ear sensation, sneezing, sore throat, swollen glands, vomiting or wheezing  Review of Systems   Review of Systems   HENT: Positive for congestion, rhinorrhea and sinus pain  Negative for sneezing and sore throat  Eyes: Negative  Respiratory: Negative  Negative for cough and wheezing  Cardiovascular: Negative  Negative for chest pain  Gastrointestinal: Negative    Negative for abdominal pain, diarrhea, nausea and vomiting  Endocrine: Negative  Genitourinary: Negative  Negative for dysuria  Musculoskeletal: Negative  Negative for joint pain and neck pain  Skin: Positive for rash  Allergic/Immunologic: Negative  Neurological: Negative  Negative for headaches  Hematological: Negative  Psychiatric/Behavioral: Negative  All other systems reviewed and are negative          Current Medications       Current Outpatient Medications:     albuterol (ACCUNEB) 1 25 MG/3ML nebulizer solution, Take 3 mL (1 25 mg total) by nebulization every 6 (six) hours as needed for wheezing, Disp: 30 vial, Rfl: 2    albuterol (PROVENTIL HFA,VENTOLIN HFA) 90 mcg/act inhaler, TAKE 2 PUFFS BY MOUTH EVERY 6 HOURS AS NEEDED FOR WHEEZE, Disp: 18 g, Rfl: 5    cetirizine (ZyrTEC) 10 MG chewable tablet, Chew 10 mg daily, Disp: , Rfl:     Flovent  MCG/ACT inhaler, INHALE 1 PUFF 2 (TWO) TIMES A DAY RINSE MOUTH AFTER USE , Disp: 12 g, Rfl: 1    Lo Loestrin Fe 1 MG-10 MCG / 10 MCG TABS, TAKE 1 TABLET BY MOUTH EVERY DAY, Disp: 84 tablet, Rfl: 1    benzonatate (TESSALON PERLES) 100 mg capsule, Take 1 capsule (100 mg total) by mouth 3 (three) times a day as needed for cough, Disp: 20 capsule, Rfl: 0    fluticasone (FLONASE) 50 mcg/act nasal spray, 1 spray into each nostril daily, Disp: 16 g, Rfl: 0    Current Allergies     Allergies as of 01/19/2022    (No Known Allergies)            The following portions of the patient's history were reviewed and updated as appropriate: allergies, current medications, past family history, past medical history, past social history, past surgical history and problem list      Past Medical History:   Diagnosis Date    Asthma        Past Surgical History:   Procedure Laterality Date    WISDOM TOOTH EXTRACTION  09/2018       Family History   Problem Relation Age of Onset    Depression Mother     Mental illness Mother     Asthma Mother     Dementia Maternal Grandmother     Dementia Maternal Grandfather     Dementia Paternal Grandmother     Dementia Paternal Grandfather     Asthma Paternal Aunt     Asthma Paternal Uncle          Medications have been verified  Objective   /80   Pulse 96   Temp 99 7 °F (37 6 °C)   Resp 18   Ht 5' (1 524 m)   Wt 69 9 kg (154 lb)   SpO2 99%   BMI 30 08 kg/m²   No LMP recorded  Physical Exam     Physical Exam  Vitals and nursing note reviewed  Constitutional:       General: She is not in acute distress  Appearance: Normal appearance  She is not ill-appearing, toxic-appearing or diaphoretic  HENT:      Head: Normocephalic  Right Ear: Tympanic membrane, ear canal and external ear normal  There is no impacted cerumen  Left Ear: Tympanic membrane, ear canal and external ear normal  There is no impacted cerumen  Nose: Congestion and rhinorrhea present  Mouth/Throat:      Mouth: Mucous membranes are moist       Pharynx: Oropharynx is clear  No oropharyngeal exudate or posterior oropharyngeal erythema  Eyes:      General: No scleral icterus  Right eye: No discharge  Left eye: No discharge  Extraocular Movements: Extraocular movements intact  Conjunctiva/sclera: Conjunctivae normal       Pupils: Pupils are equal, round, and reactive to light  Cardiovascular:      Rate and Rhythm: Normal rate  Pulses: Normal pulses  Heart sounds: No murmur heard  No friction rub  No gallop  Pulmonary:      Effort: Pulmonary effort is normal  No respiratory distress  Breath sounds: Normal breath sounds  No stridor  No wheezing, rhonchi or rales  Chest:      Chest wall: No tenderness  Abdominal:      General: Abdomen is flat  There is no distension  Palpations: There is no mass  Tenderness: There is no abdominal tenderness  There is no right CVA tenderness, left CVA tenderness, guarding or rebound  Hernia: No hernia is present     Musculoskeletal: General: Normal range of motion  Cervical back: Normal range of motion  Skin:     Capillary Refill: Capillary refill takes less than 2 seconds  Coloration: Skin is not jaundiced or pale  Findings: No bruising, erythema or lesion  Comments: Patient has bilateral areola/nipple piercings  No signs of infection induration or abscess formation  No discharge present  Neurological:      General: No focal deficit present  Mental Status: She is alert  Cranial Nerves: No cranial nerve deficit  Sensory: No sensory deficit  Motor: No weakness        Coordination: Coordination normal       Gait: Gait normal       Deep Tendon Reflexes: Reflexes normal

## 2022-02-23 ENCOUNTER — OFFICE VISIT (OUTPATIENT)
Dept: FAMILY MEDICINE CLINIC | Facility: CLINIC | Age: 22
End: 2022-02-23
Payer: COMMERCIAL

## 2022-02-23 DIAGNOSIS — F41.9 ANXIETY: Primary | ICD-10-CM

## 2022-02-23 DIAGNOSIS — J45.30 MILD PERSISTENT ASTHMA WITHOUT COMPLICATION: ICD-10-CM

## 2022-02-23 PROCEDURE — 99214 OFFICE O/P EST MOD 30 MIN: CPT | Performed by: FAMILY MEDICINE

## 2022-02-23 RX ORDER — DEXAMETHASONE 4 MG/1
2 TABLET ORAL 2 TIMES DAILY
Qty: 12 G | Refills: 5 | Status: SHIPPED | OUTPATIENT
Start: 2022-02-23

## 2022-02-23 NOTE — PROGRESS NOTES
Assessment/Plan:    No problem-specific Assessment & Plan notes found for this encounter  Diagnoses and all orders for this visit:    Anxiety    Mild persistent asthma without complication          Subjective:   No chief complaint on file  Patient ID: Severo Giovanni is a 24 y o  female  F/u asthma      The following portions of the patient's history were reviewed and updated as appropriate: allergies, current medications, past family history, past medical history, past social history, past surgical history and problem list     Review of Systems   Constitutional: Negative for fatigue, fever and unexpected weight change  HENT: Negative for congestion, sinus pain and sore throat  Eyes: Negative for visual disturbance  Respiratory: Negative for shortness of breath and wheezing  Cardiovascular: Negative for chest pain and palpitations  Gastrointestinal: Negative for abdominal pain, nausea and vomiting  Musculoskeletal: Negative  Negative for arthralgias and myalgias  Neurological: Negative for syncope, weakness and numbness  Psychiatric/Behavioral: Negative  Negative for confusion, dysphoric mood and suicidal ideas  Objective: There were no vitals filed for this visit  Physical Exam  Constitutional:       Appearance: She is well-developed  HENT:      Right Ear: Ear canal normal  Tympanic membrane is not injected  Left Ear: Ear canal normal  Tympanic membrane is not injected  Nose: Nose normal    Eyes:      General:         Right eye: No discharge  Left eye: No discharge  Conjunctiva/sclera: Conjunctivae normal       Pupils: Pupils are equal, round, and reactive to light  Neck:      Thyroid: No thyromegaly  Cardiovascular:      Rate and Rhythm: Normal rate and regular rhythm  Heart sounds: Normal heart sounds  No murmur heard  Pulmonary:      Effort: Pulmonary effort is normal  No respiratory distress        Breath sounds: Normal breath sounds  No wheezing  Abdominal:      General: Bowel sounds are normal  There is no distension  Palpations: Abdomen is soft  Tenderness: There is no abdominal tenderness  Musculoskeletal:         General: Normal range of motion  Cervical back: Normal range of motion and neck supple  Lymphadenopathy:      Cervical: No cervical adenopathy  Skin:     General: Skin is warm and dry  Neurological:      Mental Status: She is alert and oriented to person, place, and time  She is not disoriented  Sensory: No sensory deficit  Gait: Gait normal       Deep Tendon Reflexes: Reflexes are normal and symmetric  Psychiatric:         Speech: Speech normal          Behavior: Behavior normal          Thought Content:  Thought content normal          Judgment: Judgment normal

## 2022-03-11 DIAGNOSIS — Z30.9 ENCOUNTER FOR CONTRACEPTIVE MANAGEMENT, UNSPECIFIED TYPE: ICD-10-CM

## 2022-03-11 RX ORDER — NORETHINDRONE ACETATE AND ETHINYL ESTRADIOL, ETHINYL ESTRADIOL AND FERROUS FUMARATE 1MG-10(24)
KIT ORAL
Qty: 84 TABLET | Refills: 1 | Status: SHIPPED | OUTPATIENT
Start: 2022-03-11

## 2022-04-15 ENCOUNTER — OFFICE VISIT (OUTPATIENT)
Dept: FAMILY MEDICINE CLINIC | Facility: CLINIC | Age: 22
End: 2022-04-15
Payer: COMMERCIAL

## 2022-04-15 VITALS
WEIGHT: 154 LBS | BODY MASS INDEX: 30.23 KG/M2 | HEART RATE: 94 BPM | DIASTOLIC BLOOD PRESSURE: 76 MMHG | SYSTOLIC BLOOD PRESSURE: 118 MMHG | OXYGEN SATURATION: 97 % | RESPIRATION RATE: 14 BRPM | HEIGHT: 60 IN

## 2022-04-15 DIAGNOSIS — R23.8 EASY BRUISING: Primary | ICD-10-CM

## 2022-04-15 PROCEDURE — 3008F BODY MASS INDEX DOCD: CPT | Performed by: FAMILY MEDICINE

## 2022-04-15 PROCEDURE — 3725F SCREEN DEPRESSION PERFORMED: CPT | Performed by: FAMILY MEDICINE

## 2022-04-15 PROCEDURE — 1036F TOBACCO NON-USER: CPT | Performed by: FAMILY MEDICINE

## 2022-04-15 PROCEDURE — 99214 OFFICE O/P EST MOD 30 MIN: CPT | Performed by: FAMILY MEDICINE

## 2022-04-15 NOTE — PROGRESS NOTES
Assessment/Plan:    No problem-specific Assessment & Plan notes found for this encounter  Diagnoses and all orders for this visit:    Easy bruising  -     CBC and differential; Future  -     Comprehensive metabolic panel; Future  -     CBC and differential  -     Comprehensive metabolic panel          Subjective:   Chief Complaint   Patient presents with    Follow-up     Dark bruise on breast- now resolved        Patient ID: Travis Miller is a 24 y o  female  C/o bruise L breast--resolved      The following portions of the patient's history were reviewed and updated as appropriate: allergies, current medications, past family history, past medical history, past social history, past surgical history and problem list     Review of Systems   Constitutional: Negative for fatigue, fever and unexpected weight change  HENT: Negative for congestion, sinus pain and sore throat  Eyes: Negative for visual disturbance  Respiratory: Negative for shortness of breath and wheezing  Cardiovascular: Negative for chest pain and palpitations  Gastrointestinal: Negative for abdominal pain, nausea and vomiting  Musculoskeletal: Negative  Negative for arthralgias and myalgias  Neurological: Negative for syncope, weakness and numbness  Psychiatric/Behavioral: Negative  Negative for confusion, dysphoric mood and suicidal ideas  Objective:  Vitals:    04/15/22 1053   BP: 118/76   Pulse: 94   Resp: 14   SpO2: 97%   Weight: 69 9 kg (154 lb)   Height: 5' (1 524 m)      Physical Exam  Constitutional:       Appearance: She is well-developed  HENT:      Right Ear: Ear canal normal  Tympanic membrane is not injected  Left Ear: Ear canal normal  Tympanic membrane is not injected  Nose: Nose normal    Eyes:      General:         Right eye: No discharge  Left eye: No discharge  Conjunctiva/sclera: Conjunctivae normal       Pupils: Pupils are equal, round, and reactive to light     Neck: Thyroid: No thyromegaly  Cardiovascular:      Rate and Rhythm: Normal rate and regular rhythm  Heart sounds: Normal heart sounds  No murmur heard  Pulmonary:      Effort: Pulmonary effort is normal  No respiratory distress  Breath sounds: Normal breath sounds  No wheezing  Abdominal:      General: Bowel sounds are normal  There is no distension  Palpations: Abdomen is soft  Tenderness: There is no abdominal tenderness  Musculoskeletal:         General: Normal range of motion  Cervical back: Normal range of motion and neck supple  Lymphadenopathy:      Cervical: No cervical adenopathy  Skin:     General: Skin is warm and dry  Neurological:      Mental Status: She is alert and oriented to person, place, and time  She is not disoriented  Sensory: No sensory deficit  Gait: Gait normal       Deep Tendon Reflexes: Reflexes are normal and symmetric  Psychiatric:         Speech: Speech normal          Behavior: Behavior normal          Thought Content:  Thought content normal          Judgment: Judgment normal        L breast no masses

## 2022-04-16 LAB
ALBUMIN SERPL-MCNC: 4.3 G/DL (ref 3.6–5.1)
ALBUMIN/GLOB SERPL: 1.7 (CALC) (ref 1–2.5)
ALP SERPL-CCNC: 46 U/L (ref 31–125)
ALT SERPL-CCNC: 15 U/L (ref 6–29)
AST SERPL-CCNC: 17 U/L (ref 10–30)
BASOPHILS # BLD AUTO: 32 CELLS/UL (ref 0–200)
BASOPHILS NFR BLD AUTO: 0.5 %
BILIRUB SERPL-MCNC: 0.9 MG/DL (ref 0.2–1.2)
BUN SERPL-MCNC: 12 MG/DL (ref 7–25)
BUN/CREAT SERPL: NORMAL (CALC) (ref 6–22)
CALCIUM SERPL-MCNC: 9.3 MG/DL (ref 8.6–10.2)
CHLORIDE SERPL-SCNC: 105 MMOL/L (ref 98–110)
CO2 SERPL-SCNC: 27 MMOL/L (ref 20–32)
CREAT SERPL-MCNC: 0.66 MG/DL (ref 0.5–1.1)
EOSINOPHIL # BLD AUTO: 262 CELLS/UL (ref 15–500)
EOSINOPHIL NFR BLD AUTO: 4.1 %
ERYTHROCYTE [DISTWIDTH] IN BLOOD BY AUTOMATED COUNT: 11.8 % (ref 11–15)
GLOBULIN SER CALC-MCNC: 2.5 G/DL (CALC) (ref 1.9–3.7)
GLUCOSE SERPL-MCNC: 73 MG/DL (ref 65–139)
HCT VFR BLD AUTO: 44.9 % (ref 35–45)
HGB BLD-MCNC: 14.8 G/DL (ref 11.7–15.5)
LYMPHOCYTES # BLD AUTO: 2483 CELLS/UL (ref 850–3900)
LYMPHOCYTES NFR BLD AUTO: 38.8 %
MCH RBC QN AUTO: 29.5 PG (ref 27–33)
MCHC RBC AUTO-ENTMCNC: 33 G/DL (ref 32–36)
MCV RBC AUTO: 89.4 FL (ref 80–100)
MONOCYTES # BLD AUTO: 410 CELLS/UL (ref 200–950)
MONOCYTES NFR BLD AUTO: 6.4 %
NEUTROPHILS # BLD AUTO: 3213 CELLS/UL (ref 1500–7800)
NEUTROPHILS NFR BLD AUTO: 50.2 %
PLATELET # BLD AUTO: 276 THOUSAND/UL (ref 140–400)
PMV BLD REES-ECKER: 9.3 FL (ref 7.5–12.5)
POTASSIUM SERPL-SCNC: 3.7 MMOL/L (ref 3.5–5.3)
PROT SERPL-MCNC: 6.8 G/DL (ref 6.1–8.1)
RBC # BLD AUTO: 5.02 MILLION/UL (ref 3.8–5.1)
SL AMB EGFR AFRICAN AMERICAN: 145 ML/MIN/1.73M2
SL AMB EGFR NON AFRICAN AMERICAN: 125 ML/MIN/1.73M2
SODIUM SERPL-SCNC: 139 MMOL/L (ref 135–146)
WBC # BLD AUTO: 6.4 THOUSAND/UL (ref 3.8–10.8)

## 2022-04-18 ENCOUNTER — TELEPHONE (OUTPATIENT)
Dept: FAMILY MEDICINE CLINIC | Facility: CLINIC | Age: 22
End: 2022-04-18

## 2022-04-18 NOTE — TELEPHONE ENCOUNTER
----- Message from Anam Perez MD sent at 4/16/2022  6:41 AM EDT -----  Labs NL---bruise not a concern

## 2022-07-29 ENCOUNTER — TELEPHONE (OUTPATIENT)
Dept: FAMILY MEDICINE CLINIC | Facility: CLINIC | Age: 22
End: 2022-07-29

## 2022-08-22 DIAGNOSIS — Z30.9 ENCOUNTER FOR CONTRACEPTIVE MANAGEMENT, UNSPECIFIED TYPE: ICD-10-CM

## 2022-08-22 RX ORDER — NORETHINDRONE ACETATE AND ETHINYL ESTRADIOL, ETHINYL ESTRADIOL AND FERROUS FUMARATE 1MG-10(24)
KIT ORAL
Qty: 84 TABLET | Refills: 1 | Status: SHIPPED | OUTPATIENT
Start: 2022-08-22

## 2022-08-29 DIAGNOSIS — J45.30 MILD PERSISTENT ASTHMA WITHOUT COMPLICATION: ICD-10-CM

## 2022-08-29 RX ORDER — DEXAMETHASONE 4 MG/1
TABLET ORAL
Qty: 12 G | Refills: 1 | Status: SHIPPED | OUTPATIENT
Start: 2022-08-29 | End: 2022-08-29

## 2022-09-19 ENCOUNTER — OFFICE VISIT (OUTPATIENT)
Dept: FAMILY MEDICINE CLINIC | Facility: CLINIC | Age: 22
End: 2022-09-19
Payer: COMMERCIAL

## 2022-09-19 VITALS
HEART RATE: 93 BPM | BODY MASS INDEX: 30.31 KG/M2 | SYSTOLIC BLOOD PRESSURE: 109 MMHG | HEIGHT: 60 IN | DIASTOLIC BLOOD PRESSURE: 75 MMHG | TEMPERATURE: 98 F | WEIGHT: 154.4 LBS | OXYGEN SATURATION: 99 %

## 2022-09-19 DIAGNOSIS — N94.6 DYSMENORRHEA: ICD-10-CM

## 2022-09-19 DIAGNOSIS — Z00.00 ANNUAL PHYSICAL EXAM: Primary | ICD-10-CM

## 2022-09-19 DIAGNOSIS — J45.30 MILD PERSISTENT ASTHMA WITHOUT COMPLICATION: ICD-10-CM

## 2022-09-19 PROCEDURE — 3725F SCREEN DEPRESSION PERFORMED: CPT | Performed by: NURSE PRACTITIONER

## 2022-09-19 PROCEDURE — 99395 PREV VISIT EST AGE 18-39: CPT | Performed by: NURSE PRACTITIONER

## 2022-09-19 RX ORDER — ALBUTEROL SULFATE 1.25 MG/3ML
1 SOLUTION RESPIRATORY (INHALATION) EVERY 6 HOURS PRN
Qty: 90 ML | Refills: 0 | Status: SHIPPED | OUTPATIENT
Start: 2022-09-19

## 2022-09-19 NOTE — PATIENT INSTRUCTIONS

## 2022-09-19 NOTE — PROGRESS NOTES
[address]    NAME: Ella Escobar  AGE: 20 y o  SEX: female  : 2000     DATE: 2022     Assessment and Plan:     Problem List Items Addressed This Visit        Respiratory    Asthma    Relevant Medications    albuterol (ACCUNEB) 1 25 MG/3ML nebulizer solution      Other Visit Diagnoses     Annual physical exam    -  Primary    Dysmenorrhea        Relevant Orders    Ambulatory Referral to Gynecology          Immunizations and preventive care screenings were discussed with patient today  Appropriate education was printed on patient's after visit summary  Counseling:  · Alcohol/drug use: discussed moderation in alcohol intake, the recommendations for healthy alcohol use, and avoidance of illicit drug use  BMI Counseling: Body mass index is 30 15 kg/m²  The BMI is above normal  Nutrition recommendations include decreasing portion sizes and encouraging healthy choices of fruits and vegetables  Exercise recommendations include exercising 3-5 times per week  Rationale for BMI follow-up plan is due to patient being overweight or obese  Depression Screening and Follow-up Plan: Patient was screened for depression during today's encounter  They screened negative with a PHQ-2 score of 0  No follow-ups on file  Chief Complaint:     Chief Complaint   Patient presents with    Physical Exam     Pt is in the office for her annual physical  Pt's chief stated concern: pressure in right knee after standing for long periods  Pt is wearing a brace lately for support, which proves helpful  Additionally, pt has not had an "actual period" since 2022  Pt does experience menstrual cramping, a more metallic blood smell in the vaginal area, and the tiniest flecks of the uterine lining with urination  OB/GYN in [facility] (2021) stated that this phenomenon is likely caused by stress     Lab - History of Present Illness:     Adult Annual Physical   Patient here for a comprehensive physical exam  The patient reports no problems  Diet and Physical Activity  · Diet/Nutrition: well balanced diet  · Exercise: walking  Depression Screening  PHQ-2/9 Depression Screening    Little interest or pleasure in doing things: 0 - not at all  Feeling down, depressed, or hopeless: 0 - not at all  PHQ-2 Score: 0  PHQ-2 Interpretation: Negative depression screen       General Health  · Sleep: gets 4-6 hours of sleep on average  · Hearing: normal - bilateral   · Vision:   UTD less than a year ago wears glasses  · Dental: brushes teeth twice daily  /GYN Health  · Last menstrual period: march 2022- doesn't get a regular cycle  · Contraceptive method: oral contraceptives  · History of STDs?: no      Review of Systems:     Review of Systems   Constitutional: Negative  Negative for activity change, appetite change, chills, diaphoresis, fatigue, fever and unexpected weight change  HENT: Negative  Negative for congestion, dental problem, drooling, ear discharge, ear pain, facial swelling, hearing loss, mouth sores, nosebleeds, postnasal drip, rhinorrhea, sinus pressure, sinus pain, sneezing, sore throat, tinnitus, trouble swallowing and voice change  Eyes: Negative  Negative for photophobia, pain, discharge, redness, itching and visual disturbance  Respiratory: Negative  Negative for apnea, cough, choking, chest tightness, shortness of breath, wheezing and stridor  Cardiovascular: Negative  Negative for chest pain, palpitations and leg swelling  Gastrointestinal: Negative  Negative for abdominal distention, abdominal pain, anal bleeding, blood in stool, constipation, diarrhea, nausea, rectal pain and vomiting  Endocrine: Negative  Negative for polydipsia, polyphagia and polyuria  Genitourinary: Positive for menstrual problem (has not had normal period since march)   Negative for decreased urine volume, difficulty urinating, dysuria, flank pain, frequency, genital sores, hematuria, pelvic pain, urgency, vaginal bleeding, vaginal discharge and vaginal pain  Musculoskeletal: Positive for arthralgias (right knee pain)  Negative for back pain, gait problem, joint swelling, myalgias, neck pain and neck stiffness  Skin: Positive for color change  Negative for rash and wound  Allergic/Immunologic: Negative  Neurological: Negative  Negative for dizziness, tremors, seizures, syncope, facial asymmetry, speech difficulty, weakness, light-headedness, numbness and headaches  Hematological: Negative  Negative for adenopathy  Does not bruise/bleed easily  Psychiatric/Behavioral: Negative  Negative for agitation, behavioral problems, confusion, decreased concentration, dysphoric mood, hallucinations, self-injury, sleep disturbance and suicidal ideas  The patient is not nervous/anxious and is not hyperactive  Past Medical History:     Past Medical History:   Diagnosis Date    Allergic     Seasonal allergies    Anxiety     Asthma     Visual impairment       Past Surgical History:     Past Surgical History:   Procedure Laterality Date    WISDOM TOOTH EXTRACTION  09/2018      Social History:     Social History     Socioeconomic History    Marital status: Single     Spouse name: None    Number of children: None    Years of education: None    Highest education level: 12th grade   Occupational History    None   Tobacco Use    Smoking status: Passive Smoke Exposure - Never Smoker    Smokeless tobacco: Never Used    Tobacco comment: occass cigar   Vaping Use    Vaping Use: Never used   Substance and Sexual Activity    Alcohol use:  Yes     Alcohol/week: 2 0 standard drinks     Types: 2 Glasses of wine per week     Comment: socially     Drug use: Yes     Frequency: 2 0 times per week     Types: Marijuana     Comment: medical     Sexual activity: Yes     Partners: Male     Birth control/protection: Condom Male, OCP   Other Topics Concern    None   Social History Narrative    None     Social Determinants of Health     Financial Resource Strain: Not on file   Food Insecurity: Not on file   Transportation Needs: Not on file   Physical Activity: Not on file   Stress: Not on file   Social Connections: Not on file   Intimate Partner Violence: Not on file   Housing Stability: Not on file      Family History:     Family History   Problem Relation Age of Onset    Depression Mother     Mental illness Mother     Asthma Mother     Alcohol abuse Mother     Anxiety disorder Mother     Bipolar disorder Mother     Dementia Maternal Grandmother     Arthritis Maternal Grandmother     Dementia Maternal Grandfather     Dementia Paternal Grandmother     Dementia Paternal Grandfather     Asthma Paternal Aunt     Asthma Paternal Uncle     Asthma Paternal Aunt     ADD / ADHD Brother     Anxiety disorder Brother       Current Medications:     Current Outpatient Medications   Medication Sig Dispense Refill    albuterol (ACCUNEB) 1 25 MG/3ML nebulizer solution Take 3 mL (1 25 mg total) by nebulization every 6 (six) hours as needed for wheezing 90 mL 0    albuterol (PROVENTIL HFA,VENTOLIN HFA) 90 mcg/act inhaler INHALE 2 PUFFS BY MOUTH EVERY 6 HOURS AS NEEDED FOR WHEEZE 8 g 5    cetirizine (ZyrTEC) 10 MG chewable tablet Chew 10 mg daily      Flovent  MCG/ACT inhaler INHALE 2 PUFFS BY MOUTH 2 TIMES A DAY RINSE MOUTH AFTER USE  36 g 1    Lo Loestrin Fe 1 MG-10 MCG / 10 MCG TABS TAKE 1 TABLET BY MOUTH EVERY DAY 84 tablet 1     No current facility-administered medications for this visit        Allergies:     No Known Allergies   Physical Exam:     /75 (BP Location: Left arm, Patient Position: Sitting)   Pulse 93   Temp 98 °F (36 7 °C) (Tympanic)   Ht 5' (1 524 m)   Wt 70 kg (154 lb 6 4 oz)   LMP 03/18/2022 (Exact Date)   SpO2 99%   BMI 30 15 kg/m²     Physical Exam  Vitals and nursing note reviewed  Constitutional:       General: She is not in acute distress  Appearance: Normal appearance  She is normal weight  She is not ill-appearing, toxic-appearing or diaphoretic  HENT:      Head: Normocephalic  Right Ear: Tympanic membrane, ear canal and external ear normal  There is no impacted cerumen  Left Ear: Tympanic membrane, ear canal and external ear normal  There is no impacted cerumen  Nose: Nose normal  No congestion or rhinorrhea  Mouth/Throat:      Mouth: Mucous membranes are dry  Pharynx: Oropharynx is clear  No oropharyngeal exudate or posterior oropharyngeal erythema  Eyes:      General:         Right eye: No discharge  Left eye: No discharge  Extraocular Movements: Extraocular movements intact  Conjunctiva/sclera: Conjunctivae normal       Pupils: Pupils are equal, round, and reactive to light  Neck:      Vascular: No carotid bruit  Cardiovascular:      Rate and Rhythm: Normal rate and regular rhythm  Pulses: Normal pulses  Heart sounds: Normal heart sounds  No murmur heard  No friction rub  No gallop  Pulmonary:      Effort: Pulmonary effort is normal  No respiratory distress  Breath sounds: Normal breath sounds  No wheezing or rhonchi  Chest:      Chest wall: No tenderness  Abdominal:      General: Abdomen is flat  Bowel sounds are normal  There is no distension  Palpations: Abdomen is soft  There is no mass  Tenderness: There is no abdominal tenderness  There is no right CVA tenderness, left CVA tenderness, guarding or rebound  Hernia: No hernia is present  Musculoskeletal:         General: Tenderness (right knee) present  No swelling, deformity or signs of injury  Normal range of motion  Cervical back: Normal range of motion and neck supple  No rigidity or tenderness  Right lower leg: No edema  Left lower leg: No edema     Lymphadenopathy:      Cervical: No cervical adenopathy  Skin:     General: Skin is warm and dry  Capillary Refill: Capillary refill takes less than 2 seconds  Coloration: Skin is not pale  Findings: No bruising, erythema, lesion or rash  Neurological:      General: No focal deficit present  Mental Status: She is alert and oriented to person, place, and time  Cranial Nerves: No cranial nerve deficit  Sensory: No sensory deficit  Motor: No weakness  Coordination: Coordination normal       Gait: Gait normal    Psychiatric:         Mood and Affect: Mood normal          Behavior: Behavior normal          Thought Content:  Thought content normal          Judgment: Judgment normal         PHQ-2/9 Depression Screening    Little interest or pleasure in doing things: 0 - not at all  Feeling down, depressed, or hopeless: 0 - not at all  PHQ-2 Score: 0  PHQ-2 Interpretation: Negative depression screen         Tracey Rasmussen, [facility]

## 2022-12-07 ENCOUNTER — OFFICE VISIT (OUTPATIENT)
Dept: OBGYN CLINIC | Facility: CLINIC | Age: 22
End: 2022-12-07

## 2022-12-07 VITALS
DIASTOLIC BLOOD PRESSURE: 66 MMHG | BODY MASS INDEX: 30.51 KG/M2 | HEIGHT: 60 IN | WEIGHT: 155.4 LBS | SYSTOLIC BLOOD PRESSURE: 98 MMHG

## 2022-12-07 DIAGNOSIS — N91.2 AMENORRHEA: ICD-10-CM

## 2022-12-07 DIAGNOSIS — Z01.419 GYNECOLOGIC EXAM NORMAL: Primary | ICD-10-CM

## 2022-12-07 DIAGNOSIS — N94.6 DYSMENORRHEA: ICD-10-CM

## 2022-12-07 DIAGNOSIS — R82.90 URINE ABNORMALITY: ICD-10-CM

## 2022-12-07 PROBLEM — G89.29 CHRONIC LOW BACK PAIN: Status: ACTIVE | Noted: 2019-02-21

## 2022-12-07 PROBLEM — L70.9 ACNE: Status: ACTIVE | Noted: 2022-12-07

## 2022-12-07 PROBLEM — J45.40 MODERATE PERSISTENT ASTHMA: Status: ACTIVE | Noted: 2020-10-24

## 2022-12-07 PROBLEM — J45.20 INTERMITTENT ASTHMA: Status: ACTIVE | Noted: 2017-05-13

## 2022-12-07 PROBLEM — M54.50 CHRONIC LOW BACK PAIN: Status: ACTIVE | Noted: 2019-02-21

## 2022-12-07 PROBLEM — J30.1 ALLERGIC RHINITIS DUE TO POLLEN: Status: ACTIVE | Noted: 2017-05-13

## 2022-12-07 PROBLEM — R68.84 JAW PAIN: Status: RESOLVED | Noted: 2020-05-08 | Resolved: 2022-12-07

## 2022-12-07 LAB
SL AMB  POCT GLUCOSE, UA: NEGATIVE
SL AMB LEUKOCYTE ESTERASE,UA: NEGATIVE
SL AMB POCT BILIRUBIN,UA: NEGATIVE
SL AMB POCT BLOOD,UA: ABNORMAL
SL AMB POCT KETONES,UA: NEGATIVE
SL AMB POCT NITRITE,UA: NEGATIVE
SL AMB POCT PH,UA: 6
SL AMB POCT SPECIFIC GRAVITY,UA: 1.02
SL AMB POCT URINE HCG: NEGATIVE
SL AMB POCT URINE PROTEIN: NEGATIVE
SL AMB POCT UROBILINOGEN: 0.2

## 2022-12-07 NOTE — PROGRESS NOTES
Assessment/Plan   Problem List Items Addressed This Visit        Other    Gynecologic exam normal - Primary     Pap guidelines reviewed  Pap deferred secondary to negative pap in 2021 in this low risk patient  Reassured patient very common to not get menses on Lo loestrin as low estrogen and only 4 days of placebo pill  It is not concerning to skip menses while taking Lo loestrin  If having symptoms or concerns for pregnancy should take a test    Reviewed option of switching to another pill where she would be more likely to get a menses  Would like to continue Lo loestrin, currently has script through PCP and does not need refills  Return to office for annual or as needed  Other Visit Diagnoses     Dysmenorrhea        Amenorrhea        Relevant Orders    POCT urine HCG (Completed)    Urine abnormality        Relevant Orders    POCT urine dip (Completed)          Subjective:     Patient ID: Jerzy Grandchild is a 20 y o  y o  female  HPI  19 yo seen for annual exam  Currently on Lo loestrin  Has not had a menses since February 2022  Has been on Lo loestrin since age 11 and prior to March menses were monthly or every 1-3 months  This is the longest she has gone without her menses  Did have small amount of brown spotting in November  Has taken multiple negative pregnancy tests  Denies weight changes, hair/skin/nail changes, headaches  Denies bowel issues  Reports when takes blank pills has a metallic smell when she urinates  No other urinary symptoms  Denies STD concerns  Last pap: 6/7/2021 NILM   The following portions of the patient's history were reviewed and updated as appropriate:   She  has a past medical history of Allergic, Anxiety, Asthma, and Visual impairment    She   Patient Active Problem List    Diagnosis Date Noted   • Acne 12/07/2022   • Gynecologic exam normal 12/07/2022   • Moderate persistent asthma 10/24/2020   • Chronic low back pain 02/21/2019   • Intermittent asthma 05/13/2017 • Allergic rhinitis due to pollen 2017     She  has a past surgical history that includes [place] tooth extraction (2018)  Her family history includes ADD / ADHD in her brother; Alcohol abuse in her mother; Anxiety disorder in her brother and mother; Arthritis in her maternal grandmother; Asthma in her mother, paternal aunt, paternal aunt, and paternal uncle; Bipolar disorder in her mother; Dementia in her maternal grandfather, maternal grandmother, paternal grandfather, and paternal grandmother; Depression in her mother; Mental illness in her mother  She  reports that she has quit smoking  Her smoking use included cigars  She has been exposed to tobacco smoke  She has never used smokeless tobacco  She reports current alcohol use of about 2 0 standard drinks per week  She reports current drug use  Frequency: 2 00 times per week  Drug: Marijuana  Current Outpatient Medications   Medication Sig Dispense Refill   • albuterol (ACCUNEB) 1 25 MG/3ML nebulizer solution Take 3 mL (1 25 mg total) by nebulization every 6 (six) hours as needed for wheezing 90 mL 0   • albuterol (PROVENTIL HFA,VENTOLIN HFA) 90 mcg/act inhaler INHALE 2 PUFFS BY MOUTH EVERY 6 HOURS AS NEEDED FOR WHEEZE 8 g 5   • cetirizine (ZyrTEC) 10 MG chewable tablet Chew 10 mg daily     • Flovent  MCG/ACT inhaler INHALE 2 PUFFS BY MOUTH 2 TIMES A DAY RINSE MOUTH AFTER USE  36 g 1   • Lo Loestrin Fe 1 MG-10 MCG / 10 MCG TABS TAKE 1 TABLET BY MOUTH EVERY DAY 84 tablet 1     No current facility-administered medications for this visit  She has No Known Allergies       Menstrual History:  OB History        0    Para   0    Term   0       0    AB   0    Living   0       SAB   0    IAB   0    Ectopic   0    Multiple   0    Live Births   0                  No LMP recorded  (Menstrual status: Amenorrheic other)  Review of Systems   Constitutional: Negative for fatigue, fever and unexpected weight change     HENT: Negative for dental problem and sinus pressure  Eyes: Negative for visual disturbance  Respiratory: Negative for cough, shortness of breath and wheezing  Cardiovascular: Negative for chest pain  Gastrointestinal: Negative for abdominal pain, blood in stool, constipation, diarrhea, nausea and vomiting  Endocrine: Negative for polydipsia  Genitourinary: Negative for difficulty urinating, dyspareunia, dysuria, frequency, hematuria, pelvic pain and urgency  Musculoskeletal: Negative for arthralgias and back pain  Neurological: Negative for dizziness, seizures, light-headedness and headaches  Psychiatric/Behavioral: Negative for suicidal ideas  The patient is not nervous/anxious  Objective:  Vitals:    12/07/22 1509   BP: 98/66   BP Location: Left arm   Patient Position: Sitting   Cuff Size: Standard   Weight: 70 5 kg (155 lb 6 4 oz)   Height: 5' (1 524 m)      Physical Exam  Constitutional:       Appearance: Normal appearance  She is well-developed  Genitourinary:      Vulva and bladder normal       No lesions in the vagina  Right Labia: No rash, tenderness, lesions or skin changes  Left Labia: No tenderness, lesions, skin changes or rash  No labial fusion noted  No inguinal adenopathy present in the right or left side  No vaginal discharge, erythema, tenderness or bleeding  Right Adnexa: not tender, not full and no mass present  Left Adnexa: not tender, not full and no mass present  No cervical motion tenderness, discharge or lesion  Uterus is not enlarged, tender or irregular  No uterine mass detected  No urethral prolapse, tenderness or mass present  Bladder is not tender  Breasts:     Breasts are symmetrical       Right: No swelling, bleeding, inverted nipple, mass, nipple discharge, skin change or tenderness  Left: No swelling, bleeding, inverted nipple, mass, nipple discharge, skin change or tenderness     HENT:      Head: Normocephalic and atraumatic  Neck:      Thyroid: No thyromegaly  Cardiovascular:      Rate and Rhythm: Normal rate and regular rhythm  Heart sounds: Normal heart sounds  No murmur heard  No friction rub  No gallop  Pulmonary:      Effort: Pulmonary effort is normal  No respiratory distress  Breath sounds: Normal breath sounds  No wheezing  Abdominal:      General: There is no distension  Palpations: Abdomen is soft  There is no mass  Tenderness: There is no abdominal tenderness  There is no guarding or rebound  Hernia: No hernia is present  Lymphadenopathy:      Cervical: No cervical adenopathy  Upper Body:      Right upper body: No supraclavicular, axillary or pectoral adenopathy  Left upper body: No supraclavicular, axillary or pectoral adenopathy  Lower Body: No right inguinal adenopathy  No left inguinal adenopathy  Neurological:      Mental Status: She is alert and oriented to person, place, and time  Skin:     General: Skin is warm and dry     Psychiatric:         Behavior: Behavior normal

## 2022-12-07 NOTE — ASSESSMENT & PLAN NOTE
Pap guidelines reviewed  Pap deferred secondary to negative pap in 2021 in this low risk patient  Reassured patient very common to not get menses on Lo loestrin as low estrogen and only 4 days of placebo pill  It is not concerning to skip menses while taking Lo loestrin  If having symptoms or concerns for pregnancy should take a test    Reviewed option of switching to another pill where she would be more likely to get a menses  Would like to continue Lo loestrin, currently has script through PCP and does not need refills  Return to office for annual or as needed

## 2023-01-23 DIAGNOSIS — Z30.9 ENCOUNTER FOR CONTRACEPTIVE MANAGEMENT, UNSPECIFIED TYPE: ICD-10-CM

## 2023-01-23 RX ORDER — NORETHINDRONE ACETATE AND ETHINYL ESTRADIOL, ETHINYL ESTRADIOL AND FERROUS FUMARATE 1MG-10(24)
KIT ORAL
Qty: 84 TABLET | Refills: 1 | Status: SHIPPED | OUTPATIENT
Start: 2023-01-23

## 2023-02-05 PROBLEM — Z01.419 GYNECOLOGIC EXAM NORMAL: Status: RESOLVED | Noted: 2022-12-07 | Resolved: 2023-02-05

## 2023-03-26 DIAGNOSIS — J45.30 MILD PERSISTENT ASTHMA WITHOUT COMPLICATION: ICD-10-CM

## 2023-03-26 RX ORDER — DEXAMETHASONE 4 MG/1
TABLET ORAL
Qty: 36 G | Refills: 1 | Status: SHIPPED | OUTPATIENT
Start: 2023-03-26

## 2023-07-12 ENCOUNTER — TELEMEDICINE (OUTPATIENT)
Age: 23
End: 2023-07-12
Payer: COMMERCIAL

## 2023-07-12 DIAGNOSIS — Z30.9 ENCOUNTER FOR CONTRACEPTIVE MANAGEMENT, UNSPECIFIED TYPE: ICD-10-CM

## 2023-07-12 DIAGNOSIS — L70.0 ACNE VULGARIS: ICD-10-CM

## 2023-07-12 DIAGNOSIS — L70.0 ACNE VULGARIS: Primary | ICD-10-CM

## 2023-07-12 PROCEDURE — 99214 OFFICE O/P EST MOD 30 MIN: CPT | Performed by: DERMATOLOGY

## 2023-07-12 RX ORDER — NORETHINDRONE ACETATE AND ETHINYL ESTRADIOL, ETHINYL ESTRADIOL AND FERROUS FUMARATE 1MG-10(24)
KIT ORAL
Qty: 84 TABLET | Refills: 1 | Status: SHIPPED | OUTPATIENT
Start: 2023-07-12

## 2023-07-12 RX ORDER — AZELAIC ACID 0.2 G/G
CREAM CUTANEOUS 2 TIMES DAILY
Qty: 50 G | Refills: 3 | Status: SHIPPED | OUTPATIENT
Start: 2023-07-12 | End: 2023-07-13

## 2023-07-12 NOTE — PROGRESS NOTES
[facility] Note     Patient Name: Ryanne Lara  Encounter Date: 7/12/23     Have you been cared for by a [facility]ologist in the last 3 years and, if so, which description applies to you? Yes. I have been here within the last 3 years, and my medical history has NOT changed since that time. I am FEMALE/of child-bearing potential.    REVIEW OF SYSTEMS:  Have you recently had or currently have any of the following? · No changes in my recent health. PAST MEDICAL HISTORY:  Have you personally ever had or currently have any of the following? If "YES," then please provide more detail. · No changes in my medical history. FAMILY HISTORY:  Any "first degree relatives" (parent, brother, sister, or child) with the following? • No changes in my family's known health. PATIENT EXPERIENCE:    • Do you want the Dermatologist to perform a COMPLETE skin exam today including a clinical examination under the "bra and underwear" areas? NO  • If necessary, do we have your permission to call and leave a detailed message on your Preferred Phone number that includes your specific medical information? Yes      No Known Allergies   Current Outpatient Medications:   •  albuterol (ACCUNEB) 1.25 MG/3ML nebulizer solution, Take 3 mL (1.25 mg total) by nebulization every 6 (six) hours as needed for wheezing, Disp: 90 mL, Rfl: 0  •  albuterol (PROVENTIL HFA,VENTOLIN HFA) 90 mcg/act inhaler, INHALE 2 PUFFS BY MOUTH EVERY 6 HOURS AS NEEDED FOR WHEEZING, Disp: 8.5 g, Rfl: 5  •  cetirizine (ZyrTEC) 10 MG chewable tablet, Chew 10 mg daily, Disp: , Rfl:   •  Flovent  MCG/ACT inhaler, INHALE 2 PUFFS BY MOUTH 2 TIMES A DAY RINSE MOUTH AFTER USE., Disp: 36 g, Rfl: 1  •  Lo Loestrin Fe 1 MG-10 MCG / 10 MCG TABS, TAKE 1 TABLET BY MOUTH EVERY DAY, Disp: 84 tablet, Rfl: 1  •  spironolactone (ALDACTONE) 25 mg tablet, TAKE 1 PILL (25 MG) ONCE DAILY FOR TWO WEEKS. TAKE WITH LARGE GLASS OF WATER.  IF WELL-TOLERATED, INCREASE TO 2 PILLS (50 MG) DAILY. LIMIT HIGH POTASSIUM FOODS WHILE ON THIS MEDICATION., Disp: 180 tablet, Rfl: 0  •  tretinoin (RETIN-A) 0.025 % cream, Apply pea sized amount to dry face at night (inactivated by sunlight). Start 2-3 times a week and increase to nightly as tolerated. If using benzoyl peroxide containing wash, wait 30 minutes before applying tretinoin, Disp: 45 g, Rfl: 4          • Whom besides the patient is providing clinical information about today's encounter?   o NO ADDITIONAL HISTORIAN (patient alone provided history)    Physical Exam and Assessment/Plan by Diagnosis:    ACNE VULGARIS ("COMMON ACNE")    Physical Exam:  • Psychiatric/Mood:  • Anatomic Location Affected:  face  • Morphological Description:  o Open/Closed Comedones:  - Few ("Mild")  o Inflammatory Papules/Pustules:  - Several ("Moderate")  o Nodules:  - Few ("Mild")  o Scarring:  - Few ("Mild")  o Excoriations:  - Rare ("Almost Clear")  o Local Skin Redness/Erythema:  - Few ("Mild")  o Local Skin Dryness/Scaling:  - Rare ("Almost Clear")  o Local Skin Dyspigmentation:  - Few ("Mild")  • Pertinent Positives:  • Pertinent Negatives: Additional History of Present Condition:  Patient taking doxy, spironolactone x2 daily, tretinoin cream. Patient stated she's doing well on all of the medications is noticing improvements for the acne. Assessment and Plan:  • We reviewed the causes of acne, the “kinds” of acne, and the expected clinical course. • We discussed treatment options ranging from over-the-counter products, topical retinoids, antibiotics, BP, hormonal therapies (OCPs/spironolactone), and isotretinoin (Accutane). • We reviewed specific over-the-counter interventions and medications. Recommended typical hygiene measures including water-based facial products, washing regularly with mild cleanser, and refraining from picking and popping any pimples. • Recommended non-comedogenic sunscreen use daily. • Expectations of therapy discussed. Side effects, risks and benefits of medications discussed. • A comprehensive handout on Acne was provided. • The phone number to call in case of questions or concerns (and instructions to stop medications in such a scenario) was provided. • After lengthy discussion of etiology and treatment options, we decided to implement the following personalized treatment plan:    Based on a thorough discussion of this condition and the management approach to it (including a comprehensive discussion of the known risks, side effects and potential benefits of treatment), the patient (family) agrees to implement the following specific plan:    --------------------------------------------------------------------------------------  YOUR PERSONALIZED ACNE ACTION PLAN    “MORNING ROUTINE”    SKIN HYGIENE:  In the shower, wash your face, chest and back gently with Cetaphil moisturizing cleanser or Dove Fragrance-free bar. Do not use a luffa or washcloth as these tend to be too irritating to acne-prone skin. Continue Spironolactone as directed         1) ANTIBIOTICS:    • Start azelaic 15% gel acid apply topically in the mornimg    “EVENING ROUTINE”    1) SKIN HYGIENE:  In the shower, wash your face, chest and back gently with Cetaphil moisturizing cleanser or Dove Fragrance-free bar. Do not use a lufa or washcloth as these tend to be too irritating to acne-prone skin. 2) ANTIBIOTICS:                                              Start azelaic 15% gel acid apply topically in the Evening    3) TOPICAL RETINOID:  At 1 hour before bedtime (after washing your face and allowing the skin to completely dry), spread only a single pea-sized amount of this medication evenly over your entire face (avoiding your eyes or mouth):  • Tretinoin 0.025% micro cream one hour before bedtime continue          HAVING PROBLEMS WITH ANY OF YOUR TREATMENTS?     You should not be able to see any of the medicines on your face. If you can see a white film on your skin after you apply the medication, there is too much medicine in that area and you need to apply a thinner coat and make sure it is spread evenly on your face. If your skin gets too dry, you can apply a light (“non-comedogenic”) moisturizer on top of your medicine or you may switch to using the medicine every other day instead of every day. If your skin is still too irritated, you may need to switch to a milder medication. If your skin is red and very itchy, you may be allergic to the medication and you should stop using it. COMMON POSSIBLE SIDE EFFECTS OF MEDICATIONS    • Retinoids - dryness, redness, increased sun sensitivity. • Benzoyl peroxide - drying, redness, bleaching of clothes, towels and sheets, allergy. • Doxycycline - headaches; dizziness; irritation of the throat; nail changes; discoloration of teeth. • Sun sensitivity - even if you have dark skin, this medicine can make you burn more easily. Make sure you protect yourself from the sun, either by avoiding being outside between 11 AM and 3 PM, wearing and reapplying sunscreen/sunblock, or wearing sun protective clothing  • Nausea/vomiting - if you experience nausea with this medication, take it with food. WHEN AND WHERE TO CALL WITH CONCERNS  We are here to help! If you experience any unusual symptoms, then stop taking or using the medication and call our office at [phone] (SKIN). It is better to be safe than to be sorry!     Scribe Attestation    I,:  Amina Story am acting as a scribe while in the presence of the attending physician.:       I,:  Ronny Patel MD personally performed the services described in this documentation    as scribed in my presence.:         Virtual Regular Visit    Verification of patient location:    Patient is located at Home in the following state in which I hold an active license [place]      Assessment/Plan:    Problem List Items Addressed This Visit    None           Reason for visit is   Chief Complaint   Patient presents with   • Virtual Regular Visit        Encounter provider Montserrat Bray MD    Provider located at [address] [place] [address]  [address] [facility] [id number] [phone]      Recent Visits  No visits were found meeting these conditions. Showing recent visits within past 7 days and meeting all other requirements  Today's Visits  Date Type Provider Dept   07/12/23 Telemedicine Montserrat Bray MD [facility]   Showing today's visits and meeting all other requirements  Future Appointments  No visits were found meeting these conditions. Showing future appointments within next 150 days and meeting all other requirements       The patient was identified by name and date of birth. Jt Holly was informed that this is a telemedicine visit and that the visit is being conducted through the [employer]. She agrees to proceed. .  My office door was closed. The patient was notified the following individuals were present in the room [facility]. She acknowledged consent and understanding of privacy and security of the video platform. The patient has agreed to participate and understands they can discontinue the visit at any time. Patient is aware this is a billable service. Subjective  Jt Holly is a 24 y.o. female see above .       HPI     Past Medical History:   Diagnosis Date   • Allergic     Seasonal allergies   • Anxiety    • Asthma    • Visual impairment        Past Surgical History:   Procedure Laterality Date   • WISDOM TOOTH EXTRACTION  09/2018       Current Outpatient Medications   Medication Sig Dispense Refill   • albuterol (ACCUNEB) 1.25 MG/3ML nebulizer solution Take 3 mL (1.25 mg total) by nebulization every 6 (six) hours as needed for wheezing 90 mL 0   • albuterol (PROVENTIL HFA,VENTOLIN HFA) 90 mcg/act inhaler INHALE 2 PUFFS BY MOUTH EVERY 6 HOURS AS NEEDED FOR WHEEZING 8.5 g 5   • cetirizine (ZyrTEC) 10 MG chewable tablet Chew 10 mg daily     • Flovent  MCG/ACT inhaler INHALE 2 PUFFS BY MOUTH 2 TIMES A DAY RINSE MOUTH AFTER USE. 36 g 1   • Lo Loestrin Fe 1 MG-10 MCG / 10 MCG TABS TAKE 1 TABLET BY MOUTH EVERY DAY 84 tablet 1   • spironolactone (ALDACTONE) 25 mg tablet TAKE 1 PILL (25 MG) ONCE DAILY FOR TWO WEEKS. TAKE WITH LARGE GLASS OF WATER. IF WELL-TOLERATED, INCREASE TO 2 PILLS (50 MG) DAILY. LIMIT HIGH POTASSIUM FOODS WHILE ON THIS MEDICATION. 180 tablet 0   • tretinoin (RETIN-A) 0.025 % cream Apply pea sized amount to dry face at night (inactivated by sunlight). Start 2-3 times a week and increase to nightly as tolerated. If using benzoyl peroxide containing wash, wait 30 minutes before applying tretinoin 45 g 4     No current facility-administered medications for this visit.         No Known Allergies    Review of Systems    Video Exam    Vitals:       Physical Exam     Visit Time  Total Visit Duration: 4:00

## 2023-07-12 NOTE — PATIENT INSTRUCTIONS
ACNE VULGARIS ("COMMON ACNE")      Assessment and Plan: We reviewed the causes of acne, the “kinds” of acne, and the expected clinical course. We discussed treatment options ranging from over-the-counter products, topical retinoids, antibiotics, BP, hormonal therapies (OCPs/spironolactone), and isotretinoin (Accutane). We reviewed specific over-the-counter interventions and medications. Recommended typical hygiene measures including water-based facial products, washing regularly with mild cleanser, and refraining from picking and popping any pimples. Recommended non-comedogenic sunscreen use daily. Expectations of therapy discussed. Side effects, risks and benefits of medications discussed. A comprehensive handout on Acne was provided. The phone number to call in case of questions or concerns (and instructions to stop medications in such a scenario) was provided. After lengthy discussion of etiology and treatment options, we decided to implement the following personalized treatment plan:    Based on a thorough discussion of this condition and the management approach to it (including a comprehensive discussion of the known risks, side effects and potential benefits of treatment), the patient (family) agrees to implement the following specific plan:    --------------------------------------------------------------------------------------  YOUR PERSONALIZED ACNE ACTION PLAN    “MORNING ROUTINE”    SKIN HYGIENE:  In the shower, wash your face, chest and back gently with Cetaphil moisturizing cleanser or Dove Fragrance-free bar. Do not use a luffa or washcloth as these tend to be too irritating to acne-prone skin. Continue Spironolactone as directed         ANTIBIOTICS:    Start azelaic 15% gel acid apply topically in the mornimg    “EVENING ROUTINE”    SKIN HYGIENE:  In the shower, wash your face, chest and back gently with Cetaphil moisturizing cleanser or Dove Fragrance-free bar.   Do not use a lufa or washcloth as these tend to be too irritating to acne-prone skin. ANTIBIOTICS:                                              Start azelaic 15% gel acid apply topically in the Evening    TOPICAL RETINOID:  At 1 hour before bedtime (after washing your face and allowing the skin to completely dry), spread only a single pea-sized amount of this medication evenly over your entire face (avoiding your eyes or mouth):  Tretinoin 0.025% micro cream one hour before bedtime continue          HAVING PROBLEMS WITH ANY OF YOUR TREATMENTS? You should not be able to see any of the medicines on your face. If you can see a white film on your skin after you apply the medication, there is too much medicine in that area and you need to apply a thinner coat and make sure it is spread evenly on your face. If your skin gets too dry, you can apply a light (“non-comedogenic”) moisturizer on top of your medicine or you may switch to using the medicine every other day instead of every day. If your skin is still too irritated, you may need to switch to a milder medication. If your skin is red and very itchy, you may be allergic to the medication and you should stop using it. COMMON POSSIBLE SIDE EFFECTS OF MEDICATIONS    Retinoids - dryness, redness, increased sun sensitivity. Benzoyl peroxide - drying, redness, bleaching of clothes, towels and sheets, allergy. Doxycycline - headaches; dizziness; irritation of the throat; nail changes; discoloration of teeth. Sun sensitivity - even if you have dark skin, this medicine can make you burn more easily. Make sure you protect yourself from the sun, either by avoiding being outside between 11 AM and 3 PM, wearing and reapplying sunscreen/sunblock, or wearing sun protective clothing  Nausea/vomiting - if you experience nausea with this medication, take it with food. WHEN AND WHERE TO CALL WITH CONCERNS  We are here to help!   If you experience any unusual symptoms, then stop taking or using the medication and call our office at [phone] (SKIN). It is better to be safe than to be sorry!

## 2023-07-13 DIAGNOSIS — L70.0 ACNE VULGARIS: Primary | ICD-10-CM

## 2023-07-13 RX ORDER — AZELAIC ACID 0.2 G/G
CREAM CUTANEOUS
Qty: 50 G | Refills: 3 | OUTPATIENT
Start: 2023-07-13

## 2023-07-13 RX ORDER — AZELAIC ACID 0.15 G/G
1 GEL TOPICAL 2 TIMES DAILY
Qty: 30 G | Refills: 3 | Status: SHIPPED | OUTPATIENT
Start: 2023-07-13

## 2023-08-24 DIAGNOSIS — J45.30 MILD PERSISTENT ASTHMA WITHOUT COMPLICATION: ICD-10-CM

## 2023-08-24 RX ORDER — ALBUTEROL SULFATE 90 UG/1
AEROSOL, METERED RESPIRATORY (INHALATION)
Qty: 8.5 G | Refills: 5 | Status: SHIPPED | OUTPATIENT
Start: 2023-08-24

## 2023-10-25 ENCOUNTER — OFFICE VISIT (OUTPATIENT)
Dept: URGENT CARE | Facility: CLINIC | Age: 23
End: 2023-10-25
Payer: COMMERCIAL

## 2023-10-25 VITALS
OXYGEN SATURATION: 97 % | DIASTOLIC BLOOD PRESSURE: 71 MMHG | RESPIRATION RATE: 18 BRPM | TEMPERATURE: 98.5 F | HEART RATE: 90 BPM | SYSTOLIC BLOOD PRESSURE: 120 MMHG

## 2023-10-25 DIAGNOSIS — N30.01 ACUTE CYSTITIS WITH HEMATURIA: Primary | ICD-10-CM

## 2023-10-25 PROCEDURE — 99213 OFFICE O/P EST LOW 20 MIN: CPT

## 2023-10-25 RX ORDER — CEPHALEXIN 500 MG/1
500 CAPSULE ORAL EVERY 12 HOURS SCHEDULED
Qty: 14 CAPSULE | Refills: 0 | Status: SHIPPED | OUTPATIENT
Start: 2023-10-25 | End: 2023-11-01

## 2023-10-25 NOTE — PROGRESS NOTES
[facility]        NAME: Tristin Dos Santos is a 24 y.o. female  : 2000    MRN: [id number]  DATE: 2023  TIME: 2:44 PM    Assessment and Plan   Acute cystitis with hematuria [N30.01]  1. Acute cystitis with hematuria  cephalexin (KEFLEX) 500 mg capsule          Urine dip completed showing moderate blood and small leuks. Will be sent for culture. Antibiotics prescribed. Will follow up on urine culture results and change antibiotic as needed. Patient Instructions     You have been prescribed Keflex - take as directed. Discussed with patient that there are multiple conditions that can mimic UTI symptoms including STDs, bacterial vaginosis, yeast infections, kidney stones and very rarely bladder cancer. If urine culture does not reveal bacteria or if patient continues to have symptoms after finishing antibiotics, they should follow-up with primary care doctor for further evaluation. Take antibiotic as prescribed - take the entire course of medication even if you start feeling better. Future infections can be difficult to treat if you do not take all of the pills. Urine culture sent, we will notify you if any changes need to be made to your medication. If on birth control pills - antibiotics can decrease the effectiveness of birth control medications, use a back-up method during medication use and for 3-5 days afterwards. You may have been prescribed phenazopyridine (Pyridium) to take for relief of UTI symptoms such as pain, burning, irritation, and urinary frequency. It should only be taken for the first 48 hours of treatment. This drug causes orange/yellow discoloration of the urine, which is a normal side effect. You are encouraged to try over-the-counter Azo for symptom relief. Stay hydrated with water to help flush out bacteria. Avoid bladder irritants such as citrus, caffeine, chocolate, and coffee. Follow up with your PCP in 3-5 days.     Go to the ER if symptoms worsen with fever, nausea/vomiting, back pain, weakness, or if symptoms not improving after finishing antibiotic. Chief Complaint     Chief Complaint   Patient presents with    Possible UTI     Patient has had bladder pressure and pain started Thursday, and low back pain starting this morning. History of Present Illness       59-year-old female who presents with urinary symptoms x5 days. Patient reports frequency/urgency, suprapubic pressure, low back pain, and nausea without vomiting. She denies known fevers/chills, dysuria, and obvious hematuria. Denies hx frequent UTI's. Review of Systems   Review of Systems   Constitutional:  Negative for chills and fever. Respiratory:  Negative for shortness of breath. Cardiovascular:  Negative for chest pain. Gastrointestinal:  Positive for abdominal pain (Suprapubic) and nausea. Negative for diarrhea and vomiting. Genitourinary:  Positive for frequency and urgency. Negative for dysuria and hematuria. Musculoskeletal:  Positive for back pain. Negative for myalgias and neck pain. Neurological:  Negative for dizziness, light-headedness and headaches.          Current Medications       Current Outpatient Medications:     cephalexin (KEFLEX) 500 mg capsule, Take 1 capsule (500 mg total) by mouth every 12 (twelve) hours for 7 days, Disp: 14 capsule, Rfl: 0    albuterol (ACCUNEB) 1.25 MG/3ML nebulizer solution, Take 3 mL (1.25 mg total) by nebulization every 6 (six) hours as needed for wheezing, Disp: 90 mL, Rfl: 0    albuterol (PROVENTIL HFA,VENTOLIN HFA) 90 mcg/act inhaler, TAKE 2 PUFFS BY MOUTH EVERY 6 HOURS AS NEEDED FOR WHEEZE, Disp: 8.5 g, Rfl: 5    Azelaic Acid 15 % cream, Apply 1 Application topically 2 (two) times a day, Disp: 30 g, Rfl: 3    cetirizine (ZyrTEC) 10 MG chewable tablet, Chew 10 mg daily, Disp: , Rfl:     Flovent  MCG/ACT inhaler, INHALE 2 PUFFS BY MOUTH 2 TIMES A DAY RINSE MOUTH AFTER USE., Disp: 36 g, Rfl: 1    Lo Loestrin Fe 1 MG-10 MCG / 10 MCG TABS, TAKE 1 TABLET BY MOUTH EVERY DAY, Disp: 84 tablet, Rfl: 1    spironolactone (ALDACTONE) 25 mg tablet, TAKE 1 PILL (25 MG) ONCE DAILY FOR TWO WEEKS. TAKE WITH LARGE GLASS OF WATER. IF WELL-TOLERATED, INCREASE TO 2 PILLS (50 MG) DAILY. LIMIT HIGH POTASSIUM FOODS WHILE ON THIS MEDICATION., Disp: 180 tablet, Rfl: 0    tretinoin (RETIN-A) 0.025 % cream, Apply pea sized amount to dry face at night (inactivated by sunlight). Start 2-3 times a week and increase to nightly as tolerated. If using benzoyl peroxide containing wash, wait 30 minutes before applying tretinoin, Disp: 45 g, Rfl: 4    Current Allergies     Allergies as of 10/25/2023    (No Known Allergies)            The following portions of the patient's history were reviewed and updated as appropriate: allergies, current medications, past family history, past medical history, past social history, past surgical history and problem list.     Past Medical History:   Diagnosis Date    Allergic     Seasonal allergies    Anxiety     Asthma     Visual impairment        Past Surgical History:   Procedure Laterality Date    WISDOM TOOTH EXTRACTION  09/2018       Family History   Problem Relation Age of Onset    Depression Mother     Mental illness Mother     Asthma Mother     Alcohol abuse Mother     Anxiety disorder Mother     Bipolar disorder Mother     Dementia Maternal Grandmother     Arthritis Maternal Grandmother     Dementia Maternal Grandfather     Dementia Paternal Grandmother     Dementia Paternal Grandfather     Asthma Paternal Aunt     Asthma Paternal Uncle     Asthma Paternal Aunt     ADD / ADHD Brother     Anxiety disorder Brother          Medications have been verified. Objective   /71   Pulse 90   Temp 98.5 °F (36.9 °C)   Resp 18   SpO2 97%        Physical Exam     Physical Exam  Vitals and nursing note reviewed. Constitutional:       General: She is not in acute distress. Appearance: She is not ill-appearing or diaphoretic. HENT:      Mouth/Throat:      Mouth: Mucous membranes are moist.   Cardiovascular:      Rate and Rhythm: Normal rate and regular rhythm. Pulses: Normal pulses. Heart sounds: Normal heart sounds. Pulmonary:      Effort: Pulmonary effort is normal.      Breath sounds: Normal breath sounds. Abdominal:      Tenderness: There is no right CVA tenderness or left CVA tenderness. Musculoskeletal:         General: Normal range of motion. Cervical back: Normal range of motion and neck supple. Skin:     General: Skin is warm and dry. Capillary Refill: Capillary refill takes less than 2 seconds. Neurological:      Mental Status: She is alert and oriented to person, place, and time.

## 2023-10-25 NOTE — LETTER
October 25, 2023     Patient: James Erazo   YOB: 2000   Date of Visit: 10/25/2023       To Whom it May Concern:    James Erazo was seen in my clinic on 10/25/2023. She may return to work on 10/26/2023 . If you have any questions or concerns, please don't hesitate to call.          Sincerely,          ROSITA Haro        CC: No Recipients

## 2023-10-25 NOTE — PATIENT INSTRUCTIONS
You have been prescribed Keflex - take as directed. Discussed with patient that there are multiple conditions that can mimic UTI symptoms including STDs, bacterial vaginosis, yeast infections, kidney stones and very rarely bladder cancer. If urine culture does not reveal bacteria or if patient continues to have symptoms after finishing antibiotics, they should follow-up with primary care doctor for further evaluation. Take antibiotic as prescribed - take the entire course of medication even if you start feeling better. Future infections can be difficult to treat if you do not take all of the pills. Urine culture sent, we will notify you if any changes need to be made to your medication. If on birth control pills - antibiotics can decrease the effectiveness of birth control medications, use a back-up method during medication use and for 3-5 days afterwards. You may have been prescribed phenazopyridine (Pyridium) to take for relief of UTI symptoms such as pain, burning, irritation, and urinary frequency. It should only be taken for the first 48 hours of treatment. This drug causes orange/yellow discoloration of the urine, which is a normal side effect. You are encouraged to try over-the-counter Azo for symptom relief. Stay hydrated with water to help flush out bacteria. Avoid bladder irritants such as citrus, caffeine, chocolate, and coffee. Follow up with your PCP in 3-5 days. Go to the ER if symptoms worsen with fever, nausea/vomiting, back pain, weakness, or if symptoms not improving after finishing antibiotic.

## 2023-12-19 ENCOUNTER — TELEPHONE (OUTPATIENT)
Dept: FAMILY MEDICINE CLINIC | Facility: CLINIC | Age: 23
End: 2023-12-19

## 2023-12-19 DIAGNOSIS — Z30.9 ENCOUNTER FOR CONTRACEPTIVE MANAGEMENT, UNSPECIFIED TYPE: ICD-10-CM

## 2023-12-19 RX ORDER — NORETHINDRONE ACETATE AND ETHINYL ESTRADIOL, ETHINYL ESTRADIOL AND FERROUS FUMARATE 1MG-10(24)
KIT ORAL
Qty: 84 TABLET | Refills: 1 | OUTPATIENT
Start: 2023-12-19

## 2023-12-21 DIAGNOSIS — Z30.9 ENCOUNTER FOR CONTRACEPTIVE MANAGEMENT, UNSPECIFIED TYPE: ICD-10-CM

## 2023-12-21 RX ORDER — NORETHINDRONE ACETATE AND ETHINYL ESTRADIOL, ETHINYL ESTRADIOL AND FERROUS FUMARATE 1MG-10(24)
1 KIT ORAL DAILY
Qty: 28 TABLET | Refills: 0 | Status: SHIPPED | OUTPATIENT
Start: 2023-12-21 | End: 2023-12-22

## 2023-12-21 NOTE — TELEPHONE ENCOUNTER
Need to call patient -- can give 1 month of OCP however needs appointment    Message left for patient -- will refill 1 month of RX, however, will need visit prior to additional refills

## 2023-12-22 RX ORDER — NORETHINDRONE ACETATE AND ETHINYL ESTRADIOL, ETHINYL ESTRADIOL AND FERROUS FUMARATE 1MG-10(24)
1 KIT ORAL DAILY
Qty: 90 TABLET | Refills: 3 | Status: SHIPPED | OUTPATIENT
Start: 2023-12-22

## 2024-01-12 ENCOUNTER — OFFICE VISIT (OUTPATIENT)
Dept: FAMILY MEDICINE CLINIC | Facility: CLINIC | Age: 24
End: 2024-01-12
Payer: COMMERCIAL

## 2024-01-12 VITALS
WEIGHT: 161 LBS | SYSTOLIC BLOOD PRESSURE: 111 MMHG | HEART RATE: 92 BPM | BODY MASS INDEX: 31.44 KG/M2 | TEMPERATURE: 97.5 F | DIASTOLIC BLOOD PRESSURE: 79 MMHG | OXYGEN SATURATION: 99 %

## 2024-01-12 DIAGNOSIS — Z00.00 ANNUAL PHYSICAL EXAM: Primary | ICD-10-CM

## 2024-01-12 PROCEDURE — 99395 PREV VISIT EST AGE 18-39: CPT | Performed by: NURSE PRACTITIONER

## 2024-01-12 NOTE — PROGRESS NOTES
ADULT ANNUAL PHYSICAL  [facility]    NAME: Karla Turk  AGE: 23 y.o. SEX: female  : 2000     DATE: 2024     Assessment and Plan:     Problem List Items Addressed This Visit    None  Visit Diagnoses       Annual physical exam    -  Primary            Immunizations and preventive care screenings were discussed with patient today. Appropriate education was printed on patient's after visit summary.    Counseling:  Alcohol/drug use: discussed moderation in alcohol intake, the recommendations for healthy alcohol use, and avoidance of illicit drug use.  Dental Health: discussed importance of regular tooth brushing, flossing, and dental visits.  Injury prevention: discussed safety/seat belts, safety helmets, smoke detectors, carbon dioxide detectors, and smoking near bedding or upholstery.  Sexual health: discussed sexually transmitted diseases, partner selection, use of condoms, avoidance of unintended pregnancy, and contraceptive alternatives.  Exercise: the importance of regular exercise/physical activity was discussed. Recommend exercise 3-5 times per week for at least 30 minutes.       Depression Screening and Follow-up Plan: Patient was screened for depression during today's encounter. They screened negative with a PHQ-2 score of 0.        Return in about 1 year (around 2025) for Annual physical.     Chief Complaint:     Chief Complaint   Patient presents with    Physical Exam    Contraception      History of Present Illness:     Adult Annual Physical   Patient here for a comprehensive physical exam. The patient reports no problems.    Diet and Physical Activity  Diet/Nutrition: well balanced diet.   Exercise: 3-4 times a week on average.      Depression Screening  PHQ-2/9 Depression Screening    Little interest or pleasure in doing things: 0 - not at all  Feeling down, depressed, or hopeless: 0 - not at all  PHQ-2 Score:  0  PHQ-2 Interpretation: Negative depression screen       General Health  Sleep: sleeps well.   Hearing: normal - bilateral.  Vision: goes for regular eye exams.   Dental: regular dental visits.       /GYN Health  Follows with gynecology? yes   Last menstrual period: no periods since 11/2022  Contraceptive method: oral contraceptives.  History of STDs?: no.     Advanced Care Planning  Do you have an advanced directive? no  Do you have a durable medical power of ? no     Review of Systems:     Review of Systems   Constitutional:  Negative for activity change, appetite change, fatigue and fever.   HENT:  Negative for congestion, sinus pressure and sore throat.    Eyes:  Negative for pain, discharge and visual disturbance.   Respiratory:  Negative for cough, chest tightness, shortness of breath and wheezing.    Cardiovascular:  Negative for chest pain, palpitations and leg swelling.   Gastrointestinal:  Negative for abdominal distention, abdominal pain, constipation, diarrhea and nausea.   Endocrine: Negative for polydipsia, polyphagia and polyuria.   Genitourinary:  Negative for difficulty urinating, dysuria, frequency and urgency.   Musculoskeletal:  Negative for back pain, gait problem, joint swelling, myalgias and neck stiffness.   Skin:  Negative for color change.   Neurological:  Negative for dizziness, syncope, speech difficulty, weakness and headaches.   Hematological:  Negative for adenopathy. Does not bruise/bleed easily.   Psychiatric/Behavioral:  Negative for agitation, behavioral problems, confusion and hallucinations. The patient is not nervous/anxious.       Past Medical History:     Past Medical History:   Diagnosis Date    Allergic     Seasonal allergies    Anxiety     Asthma     Visual impairment       Past Surgical History:     Past Surgical History:   Procedure Laterality Date    WISDOM TOOTH EXTRACTION  09/2018      Social History:     Social History     Socioeconomic History    Marital  status: Single     Spouse name: None    Number of children: None    Years of education: None    Highest education level: 12th grade   Occupational History    None   Tobacco Use    Smoking status: Former     Types: Cigars     Passive exposure: Yes    Smokeless tobacco: Never    Tobacco comments:     occass cigar   Vaping Use    Vaping status: Never Used   Substance and Sexual Activity    Alcohol use: Yes     Alcohol/week: 2.0 standard drinks of alcohol     Types: 2 Glasses of wine per week     Comment: socially     Drug use: Yes     Frequency: 2.0 times per week     Types: Marijuana     Comment: medical     Sexual activity: Yes     Partners: Male     Birth control/protection: Condom Male, OCP   Other Topics Concern    None   Social History Narrative    None     Social Determinants of Health     Financial Resource Strain: Low Risk  (1/20/2020)    Overall Financial Resource Strain (CARDIA)     Difficulty of Paying Living Expenses: Not hard at all   Food Insecurity: No Food Insecurity (1/20/2020)    Hunger Vital Sign     Worried About Running Out of Food in the Last Year: Never true     Ran Out of Food in the Last Year: Never true   Transportation Needs: Unknown (1/20/2020)    PRAPARE - Transportation     Lack of Transportation (Medical): Patient declined     Lack of Transportation (Non-Medical): Patient declined   Physical Activity: Sufficiently Active (1/20/2020)    Exercise Vital Sign     Days of Exercise per Week: 5 days     Minutes of Exercise per Session: 60 min   Stress: Stress Concern Present (1/20/2020)    [employer] [place] [employer] - Occupational Stress Questionnaire     Feeling of Stress : To some extent   Social Connections: Socially Isolated (1/20/2020)    Social Connection and Isolation Panel [NHANES]     Frequency of Communication with Friends and Family: More than three times a week     Frequency of Social Gatherings with Friends and Family: More than three times a week     Attends  [employer] Services: Never     Active Member of Clubs or Organizations: No     Attends Club or Organization Meetings: Patient declined     Marital Status: Never    Intimate Partner Violence: Unknown (1/20/2020)    Humiliation, Afraid, Rape, and Kick questionnaire     Fear of Current or Ex-Partner: Patient declined     Emotionally Abused: Patient declined     Physically Abused: Patient declined     Sexually Abused: Patient declined   Housing Stability: Not on file      Family History:     Family History   Problem Relation Age of Onset    Depression Mother     Mental illness Mother     Asthma Mother     Alcohol abuse Mother     Anxiety disorder Mother     Bipolar disorder Mother     Dementia Maternal Grandmother     Arthritis Maternal Grandmother     Dementia Maternal Grandfather     Dementia Paternal Grandmother     Dementia Paternal Grandfather     Asthma Paternal Aunt     Asthma Paternal Uncle     Asthma Paternal Aunt     ADD / ADHD Brother     Anxiety disorder Brother       Current Medications:     Current Outpatient Medications   Medication Sig Dispense Refill    albuterol (ACCUNEB) 1.25 MG/3ML nebulizer solution Take 3 mL (1.25 mg total) by nebulization every 6 (six) hours as needed for wheezing 90 mL 0    albuterol (PROVENTIL HFA,VENTOLIN HFA) 90 mcg/act inhaler TAKE 2 PUFFS BY MOUTH EVERY 6 HOURS AS NEEDED FOR WHEEZE 8.5 g 5    Azelaic Acid 15 % cream Apply 1 Application topically 2 (two) times a day 30 g 3    cetirizine (ZyrTEC) 10 MG chewable tablet Chew 10 mg daily      Flovent  MCG/ACT inhaler INHALE 2 PUFFS BY MOUTH 2 TIMES A DAY RINSE MOUTH AFTER USE. 36 g 1    Lo Loestrin Fe 1 MG-10 MCG / 10 MCG TABS TAKE 1 TABLET BY MOUTH EVERY DAY 90 tablet 3    tretinoin (RETIN-A) 0.025 % cream Apply pea sized amount to dry face at night (inactivated by sunlight). Start 2-3 times a week and increase to nightly as tolerated. If using benzoyl peroxide containing wash, wait 30 minutes before applying  tretinoin 45 g 4     No current facility-administered medications for this visit.      Allergies:     No Known Allergies   Physical Exam:     /79 (BP Location: Left arm, Patient Position: Sitting, Cuff Size: Standard)   Pulse 92   Temp 97.5 °F (36.4 °C) (Tympanic)   Wt 73 kg (161 lb)   SpO2 99%   BMI 31.44 kg/m²     Physical Exam  Vitals and nursing note reviewed.   Constitutional:       General: She is not in acute distress.     Appearance: Normal appearance. She is normal weight. She is not ill-appearing, toxic-appearing or diaphoretic.   HENT:      Head: Normocephalic.      Right Ear: Tympanic membrane, ear canal and external ear normal. There is no impacted cerumen.      Left Ear: Tympanic membrane, ear canal and external ear normal. There is no impacted cerumen.      Nose: Nose normal. No congestion or rhinorrhea.      Mouth/Throat:      Mouth: Mucous membranes are moist.      Pharynx: Oropharynx is clear. No oropharyngeal exudate or posterior oropharyngeal erythema.   Eyes:      General: No scleral icterus.        Right eye: No discharge.         Left eye: No discharge.      Extraocular Movements: Extraocular movements intact.      Conjunctiva/sclera: Conjunctivae normal.      Pupils: Pupils are equal, round, and reactive to light.   Neck:      Vascular: No carotid bruit.   Cardiovascular:      Rate and Rhythm: Normal rate and regular rhythm.      Pulses: Normal pulses.      Heart sounds: Normal heart sounds. No murmur heard.     No friction rub. No gallop.   Pulmonary:      Effort: Pulmonary effort is normal. No respiratory distress.      Breath sounds: Normal breath sounds. No stridor. No wheezing, rhonchi or rales.   Chest:      Chest wall: No tenderness.   Abdominal:      General: Abdomen is flat. Bowel sounds are normal. There is no distension.      Palpations: Abdomen is soft. There is no mass.      Tenderness: There is no abdominal tenderness. There is no right CVA tenderness, left CVA  tenderness, guarding or rebound.      Hernia: No hernia is present.   Musculoskeletal:         General: No swelling, tenderness, deformity or signs of injury. Normal range of motion.      Cervical back: Normal range of motion and neck supple. No rigidity or tenderness. No muscular tenderness.      Right lower leg: No edema.      Left lower leg: No edema.   Lymphadenopathy:      Cervical: No cervical adenopathy.   Skin:     General: Skin is warm.      Capillary Refill: Capillary refill takes less than 2 seconds.      Coloration: Skin is not jaundiced or pale.      Findings: No bruising, erythema, lesion or rash.   Neurological:      General: No focal deficit present.      Mental Status: She is alert and oriented to person, place, and time.      Cranial Nerves: No cranial nerve deficit.      Sensory: No sensory deficit.      Motor: No weakness.      Coordination: Coordination normal.      Gait: Gait normal.      Deep Tendon Reflexes: Reflexes normal.   Psychiatric:         Mood and Affect: Mood normal.         Behavior: Behavior normal.         Thought Content: Thought content normal.         Judgment: Judgment normal.          ROSITA Hodges   [facility]

## 2024-01-25 DIAGNOSIS — J45.30 MILD PERSISTENT ASTHMA WITHOUT COMPLICATION: ICD-10-CM

## 2024-01-25 RX ORDER — DEXAMETHASONE 4 MG/1
2 TABLET ORAL 2 TIMES DAILY
Qty: 12 G | Refills: 1 | Status: SHIPPED | OUTPATIENT
Start: 2024-01-25 | End: 2024-01-25

## 2024-01-25 RX ORDER — DEXAMETHASONE 4 MG/1
2 TABLET ORAL 2 TIMES DAILY
Qty: 12 G | Refills: 5 | Status: SHIPPED | OUTPATIENT
Start: 2024-01-25

## 2024-02-08 DIAGNOSIS — J45.30 MILD PERSISTENT ASTHMA WITHOUT COMPLICATION: ICD-10-CM

## 2024-02-08 RX ORDER — ALBUTEROL SULFATE 90 UG/1
AEROSOL, METERED RESPIRATORY (INHALATION)
Qty: 8.5 G | Refills: 5 | Status: SHIPPED | OUTPATIENT
Start: 2024-02-08

## 2024-03-31 DIAGNOSIS — J45.30 MILD PERSISTENT ASTHMA WITHOUT COMPLICATION: ICD-10-CM

## 2024-04-01 ENCOUNTER — TELEPHONE (OUTPATIENT)
Dept: FAMILY MEDICINE CLINIC | Facility: CLINIC | Age: 24
End: 2024-04-01

## 2024-04-01 DIAGNOSIS — J45.30 MILD PERSISTENT ASTHMA WITHOUT COMPLICATION: Primary | ICD-10-CM

## 2024-04-01 RX ORDER — FLUTICASONE PROPIONATE 110 UG/1
2 AEROSOL, METERED RESPIRATORY (INHALATION) 2 TIMES DAILY
Qty: 12 G | Refills: 2 | Status: SHIPPED | OUTPATIENT
Start: 2024-04-01

## 2024-04-01 NOTE — TELEPHONE ENCOUNTER
Pt would like the generic version of Flovent HFA refilled. Pt mentioned that Flovent is discontinued.     [facility] in [place] [phone].

## 2024-04-03 RX ORDER — FLUTICASONE PROPIONATE 110 UG/1
2 AEROSOL, METERED RESPIRATORY (INHALATION) 2 TIMES DAILY
Refills: 5 | OUTPATIENT
Start: 2024-04-03

## 2024-05-02 ENCOUNTER — OFFICE VISIT (OUTPATIENT)
Dept: URGENT CARE | Facility: CLINIC | Age: 24
End: 2024-05-02
Payer: COMMERCIAL

## 2024-05-02 VITALS
RESPIRATION RATE: 18 BRPM | SYSTOLIC BLOOD PRESSURE: 120 MMHG | HEART RATE: 114 BPM | DIASTOLIC BLOOD PRESSURE: 86 MMHG | OXYGEN SATURATION: 99 % | TEMPERATURE: 98.9 F

## 2024-05-02 DIAGNOSIS — J06.9 ACUTE URI: Primary | ICD-10-CM

## 2024-05-02 DIAGNOSIS — J02.9 ACUTE PHARYNGITIS, UNSPECIFIED ETIOLOGY: ICD-10-CM

## 2024-05-02 LAB — S PYO AG THROAT QL: NEGATIVE

## 2024-05-02 PROCEDURE — 99213 OFFICE O/P EST LOW 20 MIN: CPT

## 2024-05-02 PROCEDURE — 87880 STREP A ASSAY W/OPTIC: CPT

## 2024-05-02 NOTE — PROGRESS NOTES
[facility]        NAME: Karla Turk is a 23 y.o. female  : 2000    MRN: [id number]  DATE: May 2, 2024  TIME: 11:28 AM    Assessment and Plan   Acute URI [J06.9]  1. Acute URI        2. Acute pharyngitis, unspecified etiology  POCT rapid strepA            Patient Instructions       Follow up with PCP in 3-5 days.  Proceed to  ER if symptoms worsen.    If tests have been performed at [facility], our office will contact you with results if changes need to be made to the care plan discussed with you at the visit.  You can review your full results on [facility].    Chief Complaint     Chief Complaint   Patient presents with    Sore Throat     Patient has had a sore throat and painful swallowing starting on Tuesday          History of Present Illness       23-year-old female with past medical history of asthma, presents for sore throat x 3 days.  Patient admits she initially developed a itchy throat.  Denies any known sick contacts.  Using DayQuil and NyQuil as needed.  Last dose was last night.  Also has runny nose, congestion, postnasal drip, cough    Sore Throat   Associated symptoms include congestion and coughing. Pertinent negatives include no ear pain.       Review of Systems   Review of Systems   Constitutional:  Positive for fever. Negative for chills.   HENT:  Positive for congestion, postnasal drip, rhinorrhea and sore throat. Negative for ear pain.    Respiratory:  Positive for cough.          Current Medications       Current Outpatient Medications:     albuterol (ACCUNEB) 1.25 MG/3ML nebulizer solution, Take 3 mL (1.25 mg total) by nebulization every 6 (six) hours as needed for wheezing, Disp: 90 mL, Rfl: 0    albuterol (PROVENTIL HFA,VENTOLIN HFA) 90 mcg/act inhaler, INHALE 2 PUFFS BY MOUTH EVERY 6 HOURS AS NEEDED FOR WHEEZE, Disp: 8.5 g, Rfl: 5    Azelaic Acid 15 % cream, Apply 1 Application topically 2 (two) times a day, Disp: 30 g, Rfl: 3    cetirizine (ZyrTEC) 10 MG  chewable tablet, Chew 10 mg daily, Disp: , Rfl:     fluticasone (Flovent HFA) 110 MCG/ACT inhaler, Inhale 2 puffs 2 (two) times a day Rinse mouth after use., Disp: 12 g, Rfl: 2    Lo Loestrin Fe 1 MG-10 MCG / 10 MCG TABS, TAKE 1 TABLET BY MOUTH EVERY DAY, Disp: 90 tablet, Rfl: 3    tretinoin (RETIN-A) 0.025 % cream, Apply pea sized amount to dry face at night (inactivated by sunlight). Start 2-3 times a week and increase to nightly as tolerated. If using benzoyl peroxide containing wash, wait 30 minutes before applying tretinoin, Disp: 45 g, Rfl: 4    Current Allergies     Allergies as of 05/02/2024    (No Known Allergies)            The following portions of the patient's history were reviewed and updated as appropriate: allergies, current medications, past family history, past medical history, past social history, past surgical history and problem list.     Past Medical History:   Diagnosis Date    Allergic     Seasonal allergies    Anxiety     Asthma     Visual impairment        Past Surgical History:   Procedure Laterality Date    WISDOM TOOTH EXTRACTION  09/2018       Family History   Problem Relation Age of Onset    Depression Mother     Mental illness Mother     Asthma Mother     Alcohol abuse Mother     Anxiety disorder Mother     Bipolar disorder Mother     Dementia Maternal Grandmother     Arthritis Maternal Grandmother     Dementia Maternal Grandfather     Dementia Paternal Grandmother     Dementia Paternal Grandfather     Asthma Paternal Aunt     Asthma Paternal Uncle     Asthma Paternal Aunt     ADD / ADHD Brother     Anxiety disorder Brother          Medications have been verified.        Objective   /86   Pulse (!) 114   Temp 98.9 °F (37.2 °C)   Resp 18   SpO2 99%   No LMP recorded. (Menstrual status: Amenorrheic other).       Physical Exam     Physical Exam  Vitals and nursing note reviewed.   Constitutional:       General: She is not in acute distress.     Appearance: She is not  toxic-appearing.   HENT:      Head: Normocephalic and atraumatic.      Right Ear: Tympanic membrane, ear canal and external ear normal.      Left Ear: Tympanic membrane, ear canal and external ear normal.      Nose: Nose normal.      Mouth/Throat:      Mouth: Mucous membranes are moist.      Pharynx: No oropharyngeal exudate or posterior oropharyngeal erythema.   Eyes:      Conjunctiva/sclera: Conjunctivae normal.   Pulmonary:      Effort: Pulmonary effort is normal.      Breath sounds: Normal breath sounds.   Lymphadenopathy:      Cervical: No cervical adenopathy.   Neurological:      Mental Status: She is alert.   Psychiatric:         Mood and Affect: Mood normal.         Behavior: Behavior normal.

## 2024-05-02 NOTE — PATIENT INSTRUCTIONS
Rapid strep was negative.  Use Tylenol and/or Motrin as needed  Recommend use of fluticasone nasal spray as needed for runny nose and congestion  Recommend use of throat lozenges with menthol, Chloraseptic spray as needed for sore throat  Follow-up with PCP in 3 to 5 days  Proceed to the ER if symptoms worsen or for any concerns

## 2024-05-02 NOTE — LETTER
May 2, 2024     Patient: Karla Turk   YOB: 2000   Date of Visit: 5/2/2024       To Whom it May Concern:    Karla Turk was seen in my clinic on 5/2/2024. She may return to work on 5/3/24 .    If you have any questions or concerns, please don't hesitate to call.         Sincerely,          Sandip Oswald PA-C        CC: No Recipients

## 2024-06-27 DIAGNOSIS — J45.30 MILD PERSISTENT ASTHMA WITHOUT COMPLICATION: ICD-10-CM

## 2024-06-27 RX ORDER — ALBUTEROL SULFATE 90 UG/1
2 AEROSOL, METERED RESPIRATORY (INHALATION) EVERY 6 HOURS PRN
Qty: 8.5 G | Refills: 5 | Status: SHIPPED | OUTPATIENT
Start: 2024-06-27

## 2024-06-27 RX ORDER — FLUTICASONE PROPIONATE 110 UG/1
2 AEROSOL, METERED RESPIRATORY (INHALATION) 2 TIMES DAILY
Qty: 12 G | Refills: 5 | Status: SHIPPED | OUTPATIENT
Start: 2024-06-27

## 2024-08-07 DIAGNOSIS — J45.30 MILD PERSISTENT ASTHMA WITHOUT COMPLICATION: ICD-10-CM

## 2024-08-08 RX ORDER — FLUTICASONE PROPIONATE 110 UG/1
2 AEROSOL, METERED RESPIRATORY (INHALATION) 2 TIMES DAILY
Qty: 12 G | Refills: 5 | Status: SHIPPED | OUTPATIENT
Start: 2024-08-08

## 2024-08-20 DIAGNOSIS — L70.0 ACNE VULGARIS: ICD-10-CM

## 2024-08-20 RX ORDER — AZELAIC ACID 0.15 G/G
1 GEL TOPICAL 2 TIMES DAILY
Qty: 50 G | Refills: 3 | Status: SHIPPED | OUTPATIENT
Start: 2024-08-20

## 2024-08-23 ENCOUNTER — OFFICE VISIT (OUTPATIENT)
Dept: OBGYN CLINIC | Facility: CLINIC | Age: 24
End: 2024-08-23
Payer: COMMERCIAL

## 2024-08-23 VITALS
WEIGHT: 171 LBS | BODY MASS INDEX: 33.57 KG/M2 | DIASTOLIC BLOOD PRESSURE: 78 MMHG | SYSTOLIC BLOOD PRESSURE: 112 MMHG | HEIGHT: 60 IN

## 2024-08-23 DIAGNOSIS — Z30.41 SURVEILLANCE FOR BIRTH CONTROL, ORAL CONTRACEPTIVES: ICD-10-CM

## 2024-08-23 DIAGNOSIS — Z12.4 SCREENING FOR CERVICAL CANCER: ICD-10-CM

## 2024-08-23 DIAGNOSIS — Z11.3 SCREEN FOR STD (SEXUALLY TRANSMITTED DISEASE): ICD-10-CM

## 2024-08-23 DIAGNOSIS — N76.2 ACUTE VULVITIS: ICD-10-CM

## 2024-08-23 DIAGNOSIS — Z30.9 ENCOUNTER FOR CONTRACEPTIVE MANAGEMENT, UNSPECIFIED TYPE: ICD-10-CM

## 2024-08-23 DIAGNOSIS — Z01.419 ENCOUNTER FOR GYNECOLOGICAL EXAMINATION WITHOUT ABNORMAL FINDING: Primary | ICD-10-CM

## 2024-08-23 PROCEDURE — S0612 ANNUAL GYNECOLOGICAL EXAMINA: HCPCS | Performed by: OBSTETRICS & GYNECOLOGY

## 2024-08-23 RX ORDER — CLOTRIMAZOLE AND BETAMETHASONE DIPROPIONATE 10; .64 MG/G; MG/G
CREAM TOPICAL 2 TIMES DAILY
Qty: 15 G | Refills: 1 | Status: SHIPPED | OUTPATIENT
Start: 2024-08-23

## 2024-08-23 RX ORDER — NORETHINDRONE ACETATE AND ETHINYL ESTRADIOL, ETHINYL ESTRADIOL AND FERROUS FUMARATE 1MG-10(24)
1 KIT ORAL DAILY
Qty: 90 TABLET | Refills: 3 | Status: SHIPPED | OUTPATIENT
Start: 2024-08-23

## 2024-08-23 NOTE — PROGRESS NOTES
[facility] - [address], [address], [place] [address]    ASSESSMENT/PLAN: Karla Turk is a 24 y.o.  who presents for annual gynecologic exam.    Encounter for routine gynecologic examination  - Routine well woman exam completed today.  - HPV Vaccination status: Immunization series complete  - STI screening offered including HIV testing: ordered   - Contraceptive counseling discussed.  Current contraception: condoms or combination OCPs  -ppa  :     Additional problems addressed during this visit:  1. Encounter for gynecological examination without abnormal finding  2. Screening for cervical cancer  -     Thinprep Tis Pap Reflex HPV mRNA E6/E7, Chlamydia/N.gonorrhoeae  3. Screen for STD (sexually transmitted disease)  -     Thinprep Tis Pap Reflex HPV mRNA E6/E7, Chlamydia/N.gonorrhoeae  -     Hepatitis Panel (Refl); Future  -     RPR (MONITOR) W/REFL TITER (REFL); Future  -     HIV 1/2 AG/AB W REFLEX [facility] only; Future  -     Hepatitis Panel (Refl)  -     RPR (MONITOR) W/REFL TITER (REFL)  -     HIV 1/2 AG/AB W REFLEX [facility] only  4. Surveillance for birth control, oral contraceptives  5. Encounter for contraceptive management, unspecified type  -     Norethin-Eth Estrad-Fe Biphas (Lo Loestrin Fe) 1 MG-10 MCG / 10 MCG TABS; Take 1 tablet by mouth daily  6. Acute vulvitis  -     clotrimazole-betamethasone (LOTRISONE) 1-0.05 % cream; Apply topically 2 (two) times a day      CC:  Annual Gynecologic Examination    HPI: Karla Turk is a 24 y.o.  who presents for annual gynecologic examination.  23 yo   here for wellness exam.    On ocp  w very light  what she calls  ghost periods.        The following portions of the patient's history were reviewed and updated as appropriate: She  has a past medical history of Allergic, Anxiety, Asthma, Sciatica, and Visual impairment.  She  has a past surgical history that includes [place] tooth extraction  (09/2018).  Her family history includes ADD / ADHD in her brother; Alcohol abuse in her mother; Anxiety disorder in her brother and mother; Arthritis in her maternal grandmother; Asthma in her mother, paternal aunt, paternal aunt, and paternal uncle; Bipolar disorder in her mother; Breast cancer in her maternal great-grandmother; Dementia in her maternal grandfather, maternal grandmother, paternal grandfather, and paternal grandmother; Depression in her mother; Mental illness in her mother.  She  reports that she has quit smoking. Her smoking use included cigars. She has been exposed to tobacco smoke. She has never used smokeless tobacco. She reports current alcohol use of about 2.0 standard drinks of alcohol per week. She reports current drug use. Frequency: 2.00 times per week. Drug: Marijuana.  Current Outpatient Medications   Medication Sig Dispense Refill   • albuterol (ACCUNEB) 1.25 MG/3ML nebulizer solution Take 3 mL (1.25 mg total) by nebulization every 6 (six) hours as needed for wheezing 90 mL 0   • albuterol (PROVENTIL HFA,VENTOLIN HFA) 90 mcg/act inhaler Inhale 2 puffs every 6 (six) hours as needed for wheezing 8.5 g 5   • Azelaic Acid 15 % cream APPLY TO AFFECTED AREA TWICE A DAY 50 g 3   • cetirizine (ZyrTEC) 10 MG chewable tablet Chew 10 mg daily     • clotrimazole-betamethasone (LOTRISONE) 1-0.05 % cream Apply topically 2 (two) times a day 15 g 1   • fluticasone (Flovent HFA) 110 MCG/ACT inhaler Inhale 2 puffs 2 (two) times a day Rinse mouth after use. 12 g 5   • Norethin-Eth Estrad-Fe Biphas (Lo Loestrin Fe) 1 MG-10 MCG / 10 MCG TABS Take 1 tablet by mouth daily 90 tablet 3   • tretinoin (RETIN-A) 0.025 % cream Apply pea sized amount to dry face at night (inactivated by sunlight). Start 2-3 times a week and increase to nightly as tolerated. If using benzoyl peroxide containing wash, wait 30 minutes before applying tretinoin 45 g 4     No current facility-administered medications for this visit.      She has No Known Allergies..    Review of Systems   Constitutional:  Negative for chills and fever.   HENT:  Negative for ear pain and sore throat.    Eyes:  Negative for pain and visual disturbance.   Respiratory:  Negative for cough and shortness of breath.    Cardiovascular:  Negative for chest pain and palpitations.   Gastrointestinal:  Negative for abdominal pain and vomiting.   Genitourinary: Negative.  Negative for dysuria and hematuria.   Musculoskeletal:  Negative for arthralgias and back pain.   Skin:  Negative for color change and rash.   Allergic/Immunologic: Negative.    Neurological: Negative.  Negative for seizures and syncope.   Hematological: Negative.    Psychiatric/Behavioral: Negative.     All other systems reviewed and are negative.        Objective:  /78 (BP Location: Left arm, Patient Position: Sitting, Cuff Size: Large)   Ht 5' (1.524 m)   Wt 77.6 kg (171 lb)   BMI 33.40 kg/m²    Physical Exam  Vitals and nursing note reviewed.   Constitutional:       Appearance: Normal appearance.   HENT:      Head: Normocephalic.   Cardiovascular:      Rate and Rhythm: Normal rate and regular rhythm.      Pulses: Normal pulses.      Heart sounds: Normal heart sounds.   Pulmonary:      Effort: Pulmonary effort is normal.      Breath sounds: Normal breath sounds.   Chest:      Chest wall: No mass, lacerations, swelling, tenderness or edema.   Breasts:     Dandy Score is 4.      Breasts are symmetrical.      Right: Normal. No swelling, bleeding, inverted nipple, mass, nipple discharge, skin change or tenderness.      Left: No swelling, bleeding, inverted nipple, mass, nipple discharge, skin change or tenderness.   Abdominal:      General: Abdomen is flat. Bowel sounds are normal.      Palpations: Abdomen is soft.   Genitourinary:     General: Normal vulva.      Exam position: Lithotomy position.      Pubic Area: No rash.       Dandy stage (genital): 4.      Labia:         Right: No rash,  tenderness or lesion.         Left: No rash, tenderness or lesion.       Urethra: No urethral pain, urethral swelling or urethral lesion.      Vagina: Normal.      Cervix: No cervical motion tenderness or discharge.      Uterus: Normal.       Adnexa: Right adnexa normal and left adnexa normal.      Rectum: Normal.   Musculoskeletal:         General: Normal range of motion.      Cervical back: Neck supple.   Lymphadenopathy:      Upper Body:      Right upper body: No supraclavicular, axillary or pectoral adenopathy.      Left upper body: No supraclavicular, axillary or pectoral adenopathy.      Lower Body: No right inguinal adenopathy. No left inguinal adenopathy.   Skin:     General: Skin is warm and dry.   Neurological:      General: No focal deficit present.      Mental Status: She is alert and oriented to person, place, and time.   Psychiatric:         Mood and Affect: Mood normal.         Behavior: Behavior normal.         Thought Content: Thought content normal.         Judgment: Judgment normal.

## 2024-08-24 LAB
C TRACH RRNA SPEC QL NAA+PROBE: NOT DETECTED
HIV 1+2 AB+HIV1 P24 AG SERPL QL IA: NORMAL
N GONORRHOEA RRNA SPEC QL NAA+PROBE: NOT DETECTED
RPR SER QL: NORMAL

## 2024-08-27 LAB
C TRACH RRNA SPEC QL NAA+PROBE: NOT DETECTED
CLINICAL INFO: NORMAL
CYTO CVX: NORMAL
CYTOLOGY CMNT CVX/VAG CYTO-IMP: NORMAL
DATE PREVIOUS BX: NORMAL
LMP START DATE: NORMAL
N GONORRHOEA RRNA SPEC QL NAA+PROBE: NOT DETECTED
SL AMB PREV. PAP:: NORMAL
SPECIMEN SOURCE CVX/VAG CYTO: NORMAL

## 2024-09-11 ENCOUNTER — VBI (OUTPATIENT)
Dept: ADMINISTRATIVE | Facility: OTHER | Age: 24
End: 2024-09-11

## 2024-09-11 NOTE — TELEPHONE ENCOUNTER
09/11/24 1:14 PM     Chart reviewed for Pap Smear (HPV) aka Cervical Cancer Screening ; nothing is submitted to the patient's insurance at this time.     Bryce Crabtree MA   [facility]

## 2024-09-22 PROBLEM — Z11.3 SCREEN FOR STD (SEXUALLY TRANSMITTED DISEASE): Status: RESOLVED | Noted: 2024-08-23 | Resolved: 2024-09-22

## 2024-09-22 PROBLEM — Z12.4 SCREENING FOR CERVICAL CANCER: Status: RESOLVED | Noted: 2024-08-23 | Resolved: 2024-09-22

## 2024-09-22 PROBLEM — Z01.419 ENCOUNTER FOR GYNECOLOGICAL EXAMINATION WITHOUT ABNORMAL FINDING: Status: RESOLVED | Noted: 2024-08-23 | Resolved: 2024-09-22

## 2024-10-31 ENCOUNTER — VBI (OUTPATIENT)
Dept: ADMINISTRATIVE | Facility: OTHER | Age: 24
End: 2024-10-31

## 2024-10-31 NOTE — TELEPHONE ENCOUNTER
10/31/24 1:57 PM     Chart reviewed for Pap Smear (HPV) aka Cervical Cancer Screening was/were submitted to the patient's insurance.     Bryce Crabtree MA   [facility]

## 2024-11-20 ENCOUNTER — OFFICE VISIT (OUTPATIENT)
Dept: DERMATOLOGY | Facility: CLINIC | Age: 24
End: 2024-11-20
Payer: COMMERCIAL

## 2024-11-20 DIAGNOSIS — L70.0 ACNE VULGARIS: Primary | ICD-10-CM

## 2024-11-20 DIAGNOSIS — L20.89 OTHER ATOPIC DERMATITIS: ICD-10-CM

## 2024-11-20 PROCEDURE — 99214 OFFICE O/P EST MOD 30 MIN: CPT

## 2024-11-20 RX ORDER — TRETINOIN 0.25 MG/G
CREAM TOPICAL
Qty: 45 G | Refills: 4 | Status: SHIPPED | OUTPATIENT
Start: 2024-11-20

## 2024-11-20 RX ORDER — AZELAIC ACID 0.15 G/G
1 GEL TOPICAL 2 TIMES DAILY
Qty: 50 G | Refills: 3 | Status: SHIPPED | OUTPATIENT
Start: 2024-11-20

## 2024-11-20 RX ORDER — HYDROCORTISONE 25 MG/G
OINTMENT TOPICAL 2 TIMES DAILY
Qty: 28.35 G | Refills: 1 | Status: SHIPPED | OUTPATIENT
Start: 2024-11-20

## 2024-11-20 NOTE — PROGRESS NOTES
"[facility] Note     Patient Name: Karla Turk  Encounter Date: 11/20/24     Have you been cared for by a [facility] Dermatologist in the last 3 years and, if so, which description applies to you?    Yes.  I have been here within the last 3 years, and my medical history has NOT changed since that time.  I am FEMALE/of child-bearing potential.    REVIEW OF SYSTEMS:  Have you recently had or currently have any of the following? No changes in my recent health.   PAST MEDICAL HISTORY:  Have you personally ever had or currently have any of the following?  If \"YES,\" then please provide more detail. No changes in my medical history.   HISTORY OF IMMUNOSUPPRESSION: Do you have a history of any of the following:  Systemic Immunosuppression such as Diabetes, Biologic or Immunotherapy, Chemotherapy, Organ Transplantation, Bone Marrow Transplantation or Prednisone?  No     Answering \"YES\" requires the addition of the dotphrase \"IMMUNOSUPPRESSED\" as the first diagnosis of the patient's visit.   FAMILY HISTORY:  Any \"first degree relatives\" (parent, brother, sister, or child) with the following?    No changes in my family's known health.   PATIENT EXPERIENCE:    Do you want the Dermatologist to perform a COMPLETE skin exam today including a clinical examination under the \"bra and underwear\" areas?  NO  If necessary, do we have your permission to call and leave a detailed message on your Preferred Phone number that includes your specific medical information?  Yes      No Known Allergies   Current Outpatient Medications:     albuterol (ACCUNEB) 1.25 MG/3ML nebulizer solution, Take 3 mL (1.25 mg total) by nebulization every 6 (six) hours as needed for wheezing, Disp: 90 mL, Rfl: 0    albuterol (PROVENTIL HFA,VENTOLIN HFA) 90 mcg/act inhaler, Inhale 2 puffs every 6 (six) hours as needed for wheezing, Disp: 8.5 g, Rfl: 5    Azelaic Acid 15 % cream, APPLY TO AFFECTED AREA TWICE A DAY, Disp: 50 g, Rfl: 3    " "cetirizine (ZyrTEC) 10 MG chewable tablet, Chew 10 mg daily, Disp: , Rfl:     clotrimazole-betamethasone (LOTRISONE) 1-0.05 % cream, Apply topically 2 (two) times a day, Disp: 15 g, Rfl: 1    fluticasone (Flovent HFA) 110 MCG/ACT inhaler, Inhale 2 puffs 2 (two) times a day Rinse mouth after use., Disp: 12 g, Rfl: 5    Norethin-Eth Estrad-Fe Biphas (Lo Loestrin Fe) 1 MG-10 MCG / 10 MCG TABS, Take 1 tablet by mouth daily, Disp: 90 tablet, Rfl: 3    tretinoin (RETIN-A) 0.025 % cream, Apply pea sized amount to dry face at night (inactivated by sunlight). Start 2-3 times a week and increase to nightly as tolerated. If using benzoyl peroxide containing wash, wait 30 minutes before applying tretinoin, Disp: 45 g, Rfl: 4          Whom besides the patient is providing clinical information about today's encounter?   NO ADDITIONAL HISTORIAN (patient alone provided history)    Physical Exam and Assessment/Plan by Diagnosis:      ACNE VULGARIS    Physical Exam:  Anatomic Locations Involved: Face  Global Assessment: ALMOST CLEAR: A few scattered comedones and a few small inflammatory papules. PIH on lower 2/3 of face  Scarring Present? NONE  Pertinent Positives:  Pertinent Negatives:    Additional History of Present Condition:  Patient is currently using Tretinoin 0.025% cream and Azelaic acid 15%  cream at night. She ran out of the azelaic acid and skin has been flaring a bit. She presents today for refills        TODAY'S PLAN:     PRESCRIPTION MANAGEMENT:  Several treatment options were discussed including topical retinoids and their side effects.     Skin Hygiene:      Wash affected areas (face, chest, and back) TWICE A DAY with a mild cleanser such as Cetaphil or Cerave.  Use only mild cleansers (hypoallergenic and without fragrances) and fragrance free detergent (not \"unscented\" products which contain a masking agent); we discussed avoiding irritants/fragranced products.  Apply a good oil-free facial moisturizer AT LEAST TWO " "TIMES A DAY \" such as Cetaphil or Cerave.  Minimize the application of oils and cosmetics to the affected skin.  This includes HAIR PRODUCTS such as \"leave in\" conditioners.  Unless the product specifically states that it \"won't cause acne,\" \"won't clog pores,\" and/or \"is non-comedogenic\" then it may actually CAUSE acne.  If you smoke, STOP. Nicotine increases sebum retention and increased scale within the follicles, forming comedones (blackheads and whiteheads).  Abrasive treatments such as dermabrasion and spa facials may aggravate inflammatory acne.  Do NOT scratch or pick your acne bumps.  The evidence that diet directly affects acne remains weak.  However, diet does affect your overall health.  Eat plenty of fresh fruit and vegetables.  Avoid protein or amino acid supplements, particularly if they contain leucine. Consider a low-glycemic, low-protein and low-dairy diet.  Be mindful that certain medications may cause of aggravate acne.  Make sure to tell your Dermatologist if you start a new prescription, nutritional supplement, and/or herbal remedy.      MORNING Topical Regimen:      Wash with gentle cleanser   Apply azelaic acid 15% cream    EVENING Topical Regimen:      Tretinoin 0.025% cream AT LEAST 1 HOUR BEFORE BEDTIME:  Evenly spread a SINGLE pea-sized amount of this medication over your entire face, avoiding the eyes and corners of the mouth.  Azelaic Acid 15% cream  If azelaic acid not covered by insurance we can send to [place] [facility]       SYSTEMIC Strategies:      NONE          ATOPIC DERMATITIS (\"childhood Eczema\")    Physical Exam:  Anatomic Location Affected:  Upper lip  Morphological Description:  Erythematous patch   Body Surface Area Today:  <1%  Overall Severity: mild  Pertinent Positives:  Pertinent Negatives:    Additional History of Present Condition:  Patient reports a new patch of eczema on her upper lip. She has been using topical cortisone and aquaphor     Assessment and " "Plan:  Based on a thorough discussion of this condition and the management approach to it (including a comprehensive discussion of the known risks, side effects and potential benefits of treatment), the patient (family) agrees to implement the following specific plan:  Start using Hydrocortisone 2.5% ointment twice daily for 1-2 weeks. You may restart as needed for future flares.  Continue to use aquaphor  Avoid lip biting/licking      Assessment and Plan:   Atopic Dermatitis is a chronic, itchy skin condition that is very common in children but may occur at any age. It is also known as “eczema” or “atopic eczema.” It is the most common form of dermatitis.    Atopic dermatitis usually occurs in people who have an “atopic tendency.”  This means they may develop any or all of these closely linked conditions:  Atopic dermatitis, asthma, hay fever (allergic rhinitis), eosinophilic esophagitis, and gastroenteritis.  Often these conditions run within families with a parent, child or sibling also affected. A family history of asthma, eczema or hay fever is particularly useful in diagnosing atopic dermatitis in infants.    Atopic dermatitis arises because of a complex interaction of genetic and environmental factors. These include defects in skin barrier function making the skin more susceptible to irritation by soap and other contact irritants, the weather, temperature and non-specific triggers.  There is also an element of immune system dysregulation that is often present.  By definition, it is chronic and has a \"waxing-waning\" nature; flares should be expected but with good education and treatment strategies can be minimized.    Some specific tips we discussed:  Dry skin care.  Using only mild cleansers (hypoallergenic and without fragrances) and fragrance free detergent (not “unscented” products which contain a masking agent); we discussed avoiding irritants/fragranced products.  The importance of regular application of " moisturizers daily (at least 3 times a day)  The known and theoretical side effects of steroids at length, including but not limited to atrophy of skin and increased pressure in eye (glaucoma) and clouding of the eye's lens (cataracts) if used in or around the eye for extended durations.  The specific over-the-counter interventions and medications.  Side effects, risks and benefits of topical and oral medications discussed.     Scribe Attestation      I,:  Uri Ambrocio am acting as a scribe while in the presence of the attending physician.:       I,:  Julia Hastings PA-C personally performed the services described in this documentation    as scribed in my presence.:

## 2024-11-21 ENCOUNTER — TELEPHONE (OUTPATIENT)
Age: 24
End: 2024-11-21

## 2024-11-21 NOTE — TELEPHONE ENCOUNTER
PA for Azelaic Acid 15% cream SUBMITTED to [facility]     via    [x]CMM-KEY: [id number]  []Surescripts-Case ID #   []Availity-Auth ID # NDC #   []Faxed to plan   []Other website   []Phone call Case ID #     [x]PA sent as URGENT    All office notes, labs and other pertaining documents and studies sent. Clinical questions answered. Awaiting determination from insurance company.     Turnaround time for your insurance to make a decision on your Prior Authorization can take 7-21 business days.

## 2024-11-26 NOTE — TELEPHONE ENCOUNTER
PA for Azelaic Acid 15% Gel DENIED    Reason:(Screenshot if applicable)        Message sent to office clinical pool Yes    Denial letter scanned into Media Yes    Appeal started No (Provider will need to decide if appeal is warranted and send clinical documentation to Prior Authorization Team for initiation.)    **Please follow up with your patient regarding denial and next steps**

## 2024-12-10 ENCOUNTER — APPOINTMENT (EMERGENCY)
Dept: RADIOLOGY | Facility: HOSPITAL | Age: 24
End: 2024-12-10
Payer: COMMERCIAL

## 2024-12-10 ENCOUNTER — HOSPITAL ENCOUNTER (EMERGENCY)
Facility: HOSPITAL | Age: 24
Discharge: HOME | End: 2024-12-11
Attending: EMERGENCY MEDICINE
Payer: COMMERCIAL

## 2024-12-10 DIAGNOSIS — J45.30 MILD PERSISTENT ASTHMA WITHOUT COMPLICATION: ICD-10-CM

## 2024-12-10 DIAGNOSIS — J45.901 ASTHMA EXACERBATION: Primary | ICD-10-CM

## 2024-12-10 LAB
BASOPHILS # BLD AUTO: 0.04 THOUSANDS/ÂΜL (ref 0–0.1)
BASOPHILS NFR BLD AUTO: 0 % (ref 0–1)
EOSINOPHIL # BLD AUTO: 0.4 THOUSAND/ÂΜL (ref 0–0.61)
EOSINOPHIL NFR BLD AUTO: 4 % (ref 0–6)
ERYTHROCYTE [DISTWIDTH] IN BLOOD BY AUTOMATED COUNT: 12.4 % (ref 11.6–15.1)
HCT VFR BLD AUTO: 46.6 % (ref 34.8–46.1)
HGB BLD-MCNC: 15.8 G/DL (ref 11.5–15.4)
IMM GRANULOCYTES # BLD AUTO: 0.05 THOUSAND/UL (ref 0–0.2)
IMM GRANULOCYTES NFR BLD AUTO: 1 % (ref 0–2)
LYMPHOCYTES # BLD AUTO: 3.93 THOUSANDS/ÂΜL (ref 0.6–4.47)
LYMPHOCYTES NFR BLD AUTO: 42 % (ref 14–44)
MCH RBC QN AUTO: 29.8 PG (ref 26.8–34.3)
MCHC RBC AUTO-ENTMCNC: 33.9 G/DL (ref 31.4–37.4)
MCV RBC AUTO: 88 FL (ref 82–98)
MONOCYTES # BLD AUTO: 0.77 THOUSAND/ÂΜL (ref 0.17–1.22)
MONOCYTES NFR BLD AUTO: 8 % (ref 4–12)
NEUTROPHILS # BLD AUTO: 4.25 THOUSANDS/ÂΜL (ref 1.85–7.62)
NEUTS SEG NFR BLD AUTO: 45 % (ref 43–75)
NRBC BLD AUTO-RTO: 0 /100 WBCS
PLATELET # BLD AUTO: 309 THOUSANDS/UL (ref 149–390)
PMV BLD AUTO: 8.7 FL (ref 8.9–12.7)
RBC # BLD AUTO: 5.3 MILLION/UL (ref 3.81–5.12)
WBC # BLD AUTO: 9.44 THOUSAND/UL (ref 4.31–10.16)

## 2024-12-10 PROCEDURE — 80048 BASIC METABOLIC PNL TOTAL CA: CPT | Performed by: EMERGENCY MEDICINE

## 2024-12-10 PROCEDURE — 85025 COMPLETE CBC W/AUTO DIFF WBC: CPT | Performed by: EMERGENCY MEDICINE

## 2024-12-10 PROCEDURE — 99284 EMERGENCY DEPT VISIT MOD MDM: CPT

## 2024-12-10 PROCEDURE — 99285 EMERGENCY DEPT VISIT HI MDM: CPT | Performed by: EMERGENCY MEDICINE

## 2024-12-10 PROCEDURE — 93005 ELECTROCARDIOGRAM TRACING: CPT

## 2024-12-10 PROCEDURE — 96365 THER/PROPH/DIAG IV INF INIT: CPT

## 2024-12-10 PROCEDURE — 36415 COLL VENOUS BLD VENIPUNCTURE: CPT | Performed by: EMERGENCY MEDICINE

## 2024-12-10 PROCEDURE — 81025 URINE PREGNANCY TEST: CPT | Performed by: EMERGENCY MEDICINE

## 2024-12-10 PROCEDURE — 94644 CONT INHLJ TX 1ST HOUR: CPT

## 2024-12-10 PROCEDURE — 94760 N-INVAS EAR/PLS OXIMETRY 1: CPT

## 2024-12-10 PROCEDURE — 71045 X-RAY EXAM CHEST 1 VIEW: CPT

## 2024-12-10 RX ORDER — ALBUTEROL SULFATE 5 MG/ML
10 SOLUTION RESPIRATORY (INHALATION) ONCE
Status: COMPLETED | OUTPATIENT
Start: 2024-12-10 | End: 2024-12-10

## 2024-12-10 RX ORDER — SODIUM CHLORIDE FOR INHALATION 0.9 %
12 VIAL, NEBULIZER (ML) INHALATION ONCE
Status: COMPLETED | OUTPATIENT
Start: 2024-12-10 | End: 2024-12-10

## 2024-12-10 RX ORDER — MAGNESIUM SULFATE HEPTAHYDRATE 40 MG/ML
2 INJECTION, SOLUTION INTRAVENOUS ONCE
Status: COMPLETED | OUTPATIENT
Start: 2024-12-10 | End: 2024-12-11

## 2024-12-10 RX ADMIN — MAGNESIUM SULFATE HEPTAHYDRATE 2 G: 40 INJECTION, SOLUTION INTRAVENOUS at 23:53

## 2024-12-10 RX ADMIN — SODIUM CHLORIDE 1000 ML: 0.9 INJECTION, SOLUTION INTRAVENOUS at 23:53

## 2024-12-10 RX ADMIN — IPRATROPIUM BROMIDE 1 MG: 0.5 SOLUTION RESPIRATORY (INHALATION) at 23:33

## 2024-12-10 RX ADMIN — ISODIUM CHLORIDE 12 ML: 0.03 SOLUTION RESPIRATORY (INHALATION) at 23:33

## 2024-12-10 RX ADMIN — ALBUTEROL SULFATE 10 MG: 2.5 SOLUTION RESPIRATORY (INHALATION) at 23:33

## 2024-12-10 NOTE — Clinical Note
Karla Turk was seen and treated in our emergency department on 12/10/2024.                Diagnosis:     Karla  may return to work on return date.    She may return on this date: 12/12/2024         If you have any questions or concerns, please don't hesitate to call.      Nimisha Mckinnon, DO    ______________________________           _______________          _______________  Hospital Representative                              Date                                Time 2

## 2024-12-10 NOTE — Clinical Note
Francisco Hernandez accompanied Karla Turk to the emergency department on 12/10/2024.    Return date if applicable: 12/12/2024        If you have any questions or concerns, please don't hesitate to call.      Nimisha Mckinnon, DO

## 2024-12-10 NOTE — Clinical Note
accompanied Karla Turk to the emergency department on 12/10/2024.    Return date if applicable: 12/12/2024        If you have any questions or concerns, please don't hesitate to call.      Nimisha Mckinnon, DO

## 2024-12-10 NOTE — Clinical Note
Karla Turk was seen and treated in our emergency department on 12/10/2024.                Diagnosis:     Karla  may return to work on return date.    She may return on this date: 12/12/2024         If you have any questions or concerns, please don't hesitate to call.      Nimisha Mckinnon, DO    ______________________________           _______________          _______________  Hospital Representative                              Date                                Time

## 2024-12-10 NOTE — Clinical Note
Francisco Hernandez accompanied Karla Turk to the emergency department on 12/10/2024.    Return date if applicable: 12/12/2024        If you have any questions or concerns, please don't hesitate to call.      Nimisha Mckinnno, DO

## 2024-12-11 VITALS
HEIGHT: 60 IN | RESPIRATION RATE: 20 BRPM | OXYGEN SATURATION: 98 % | HEART RATE: 108 BPM | TEMPERATURE: 97.7 F | DIASTOLIC BLOOD PRESSURE: 85 MMHG | SYSTOLIC BLOOD PRESSURE: 130 MMHG | BODY MASS INDEX: 33.38 KG/M2 | WEIGHT: 170 LBS

## 2024-12-11 LAB
ANION GAP SERPL CALCULATED.3IONS-SCNC: 10 MMOL/L (ref 4–13)
BUN SERPL-MCNC: 11 MG/DL (ref 5–25)
CALCIUM SERPL-MCNC: 8.9 MG/DL (ref 8.4–10.2)
CHLORIDE SERPL-SCNC: 105 MMOL/L (ref 96–108)
CO2 SERPL-SCNC: 22 MMOL/L (ref 21–32)
CREAT SERPL-MCNC: 0.76 MG/DL (ref 0.6–1.3)
EXT PREGNANCY TEST URINE: NEGATIVE
EXT. CONTROL: NORMAL
GFR SERPL CREATININE-BSD FRML MDRD: 109 ML/MIN/1.73SQ M
GLUCOSE SERPL-MCNC: 95 MG/DL (ref 65–140)
POTASSIUM SERPL-SCNC: 3.7 MMOL/L (ref 3.5–5.3)
SODIUM SERPL-SCNC: 137 MMOL/L (ref 135–147)

## 2024-12-11 PROCEDURE — 96366 THER/PROPH/DIAG IV INF ADDON: CPT

## 2024-12-11 RX ORDER — PREDNISONE 20 MG/1
40 TABLET ORAL ONCE
Status: COMPLETED | OUTPATIENT
Start: 2024-12-11 | End: 2024-12-11

## 2024-12-11 RX ORDER — PREDNISONE 20 MG/1
40 TABLET ORAL DAILY
Qty: 8 TABLET | Refills: 0 | Status: SHIPPED | OUTPATIENT
Start: 2024-12-11 | End: 2024-12-16 | Stop reason: ALTCHOICE

## 2024-12-11 RX ADMIN — PREDNISONE 40 MG: 20 TABLET ORAL at 02:21

## 2024-12-11 NOTE — ED PROVIDER NOTES
Time reflects when diagnosis was documented in both MDM as applicable and the Disposition within this note       Time User Action Codes Description Comment    12/11/2024  2:18 AM Hu Jay Add [J45.901] Asthma exacerbation           ED Disposition       ED Disposition   Discharge    Condition   Stable    Date/Time   Wed Dec 11, 2024  2:18 AM    Comment   Karla Turk discharge to home/self care.                   Assessment & Plan       Medical Decision Making  24-year-old female with chest tightness and shortness of breath in setting of known asthma, likely in setting of asthma exacerbation, unclear trigger will treat symptomatically with albuterol, steroids, magnesium as patient has tried albuterol itself as well as steroid inhaler at home.    Will obtain EKG to evaluate for arrhythmia blood gas to evaluate for hypercapnia and respiratory acidosis, chest x-ray to evaluate for pneumothorax, pneumonia    Amount and/or Complexity of Data Reviewed  Labs: ordered.  Radiology: ordered and independent interpretation performed.    Risk  Prescription drug management.        ED Course as of 12/11/24 0219   Tue Dec 10, 2024   2329 Procedure Note: EKG  Date/Time: 12/10/24 11:29 PM   Performed by: HU JAY  Authorized by: HU JAY  Indications / Diagnosis: Chest Tightness  ECG reviewed by me, the ED Provider: yes   The EKG demonstrates:  Rhythm: normal sinus  Intervals: normal intervals  Axis: normal axis  QRS/Blocks: normal QRS  ST Changes:No acute ST Changes, no STD/JACQUE.       Wed Dec 11, 2024   0219 Patient feeling significant better, no respiratory difficulty no hypoxia, no accessory muscle use, no wheezing, stable for discharge at this point no indication for further inpatient elation or treatment       Medications   predniSONE tablet 40 mg (has no administration in time range)   albuterol inhalation solution 10 mg (10 mg Nebulization Given 12/10/24 2333)   ipratropium (ATROVENT) 0.02 % inhalation solution 1  mg (1 mg Nebulization Given 12/10/24 2333)   sodium chloride 0.9 % inhalation solution 12 mL (12 mL Nebulization Given 12/10/24 2333)   magnesium sulfate 2 g/50 mL IVPB (premix) 2 g (0 g Intravenous Stopped 12/11/24 0212)   sodium chloride 0.9 % bolus 1,000 mL (0 mL Intravenous Stopped 12/11/24 0212)       ED Risk Strat Scores                                               History of Present Illness       Chief Complaint   Patient presents with    Asthma     Pt c/o of asthma attack       Past Medical History:   Diagnosis Date    Acne     Allergic     Seasonal allergies    Anxiety     Asthma     Sciatica     Visual impairment       Past Surgical History:   Procedure Laterality Date    WISDOM TOOTH EXTRACTION  09/2018      Family History   Problem Relation Age of Onset    Depression Mother     Mental illness Mother     Asthma Mother     Alcohol abuse Mother     Anxiety disorder Mother     Bipolar disorder Mother     Acne Mother     ADD / ADHD Brother     Anxiety disorder Brother     Dementia Maternal Grandmother     Arthritis Maternal Grandmother     Dementia Maternal Grandfather     Dementia Paternal Grandmother     Dementia Paternal Grandfather     Asthma Paternal Aunt     Asthma Paternal Aunt     Asthma Paternal Uncle     Breast cancer Maternal Great-Grandmother     Ovarian cancer Neg Hx     Colon cancer Neg Hx       Social History     Tobacco Use    Smoking status: Former     Types: Cigars     Passive exposure: Yes    Smokeless tobacco: Never    Tobacco comments:     occass cigar   Vaping Use    Vaping status: Never Used   Substance Use Topics    Alcohol use: Yes     Alcohol/week: 2.0 standard drinks of alcohol     Types: 2 Glasses of wine per week     Comment: socially     Drug use: Yes     Frequency: 2.0 times per week     Types: Marijuana     Comment: medical       E-Cigarette/Vaping    E-Cigarette Use Never User       E-Cigarette/Vaping Substances    Nicotine No     Flavoring No       I have reviewed and agree  with the history as documented.     24-year-old female with history of longstanding asthma presents for evaluation of acute with difficulty breathing only partially helped by her asthma inhaler as well as albuterol nebulizer and steroid inhaler, she states that typically her asthma is well-controlled, she has not been on steroids orally in quite some time and does not use her rescue inhaler daily.  Symptoms consistent with previous asthma exacerbation with chest tightness and difficulty breathing.  No fevers, no chills, did have some congestion.  No other sick contacts.  No pleuritic chest pain.  No history of severe exacerbations.         Review of Systems   Constitutional:  Negative for appetite change and fever.   HENT:  Positive for congestion. Negative for rhinorrhea and sore throat.    Eyes:  Negative for photophobia and visual disturbance.   Respiratory:  Positive for chest tightness and shortness of breath. Negative for cough and wheezing.    Cardiovascular:  Negative for chest pain, palpitations and leg swelling.   Gastrointestinal:  Negative for abdominal distention, abdominal pain, blood in stool, constipation and diarrhea.   Genitourinary:  Negative for dysuria, flank pain, frequency, hematuria and urgency.   Musculoskeletal:  Negative for back pain.   Skin:  Negative for rash.   Neurological:  Negative for dizziness, weakness and headaches.   All other systems reviewed and are negative.          Objective       ED Triage Vitals   Temperature Pulse Blood Pressure Respirations SpO2 Patient Position - Orthostatic VS   12/10/24 2314 12/10/24 2314 12/10/24 2314 12/10/24 2314 12/10/24 2314 --   97.7 °F (36.5 °C) 98 95/86 18 98 %       Temp src Heart Rate Source BP Location FiO2 (%) Pain Score    -- 12/11/24 0130 12/10/24 2314 -- --     Monitor Right arm        Vitals      Date and Time Temp Pulse SpO2 Resp BP Pain Score FACES Pain Rating User   12/11/24 0215 -- 108 98 % 20 130/85 -- --    12/11/24 0130 --  108 98 % 22 118/75 -- --    12/10/24 2314 97.7 °F (36.5 °C) 98 98 % 18 95/86 -- -- EK            Physical Exam  Vitals and nursing note reviewed.   Constitutional:       Appearance: She is well-developed.   HENT:      Head: Normocephalic and atraumatic.   Eyes:      Pupils: Pupils are equal, round, and reactive to light.   Cardiovascular:      Rate and Rhythm: Normal rate and regular rhythm.      Heart sounds: No murmur heard.     No friction rub. No gallop.   Pulmonary:      Effort: Pulmonary effort is normal.      Breath sounds: Wheezing present. No rales.      Comments: Decreased air movement bilaterally  Chest:      Chest wall: No tenderness.   Abdominal:      General: There is no distension.      Palpations: Abdomen is soft. There is no mass.      Tenderness: There is no guarding or rebound.   Musculoskeletal:      Cervical back: Normal range of motion and neck supple.   Skin:     General: Skin is warm and dry.   Neurological:      Mental Status: She is alert and oriented to person, place, and time.         Results Reviewed       Procedure Component Value Units Date/Time    POCT pregnancy, urine [[id number]]  (Normal) Collected: 12/11/24 0211    Lab Status: Final result Updated: 12/11/24 0211     EXT Preg Test, Ur Negative     Control Valid    Basic metabolic panel [[id number]] Collected: 12/10/24 2353    Lab Status: Final result Specimen: Blood from Arm, Right Updated: 12/11/24 0018     Sodium 137 mmol/L      Potassium 3.7 mmol/L      Chloride 105 mmol/L      CO2 22 mmol/L      ANION GAP 10 mmol/L      BUN 11 mg/dL      Creatinine 0.76 mg/dL      Glucose 95 mg/dL      Calcium 8.9 mg/dL      eGFR 109 ml/min/1.73sq m     Narrative:      National Kidney Disease Foundation guidelines for Chronic Kidney Disease (CKD):     Stage 1 with normal or high GFR (GFR > 90 mL/min/1.73 square meters)    Stage 2 Mild CKD (GFR = 60-89 mL/min/1.73 square meters)    Stage 3A Moderate CKD (GFR = 45-59 mL/min/1.73 square meters)     Stage 3B Moderate CKD (GFR = 30-44 mL/min/1.73 square meters)    Stage 4 Severe CKD (GFR = 15-29 mL/min/1.73 square meters)    Stage 5 End Stage CKD (GFR <15 mL/min/1.73 square meters)  Note: GFR calculation is accurate only with a steady state creatinine    CBC and differential [[id number]]  (Abnormal) Collected: 12/10/24 2353    Lab Status: Final result Specimen: Blood from Arm, Right Updated: 12/10/24 2359     WBC 9.44 Thousand/uL      RBC 5.30 Million/uL      Hemoglobin 15.8 g/dL      Hematocrit 46.6 %      MCV 88 fL      MCH 29.8 pg      MCHC 33.9 g/dL      RDW 12.4 %      MPV 8.7 fL      Platelets 309 Thousands/uL      nRBC 0 /100 WBCs      Segmented % 45 %      Immature Grans % 1 %      Lymphocytes % 42 %      Monocytes % 8 %      Eosinophils Relative 4 %      Basophils Relative 0 %      Absolute Neutrophils 4.25 Thousands/µL      Absolute Immature Grans 0.05 Thousand/uL      Absolute Lymphocytes 3.93 Thousands/µL      Absolute Monocytes 0.77 Thousand/µL      Eosinophils Absolute 0.40 Thousand/µL      Basophils Absolute 0.04 Thousands/µL             XR chest portable   ED Interpretation by Nimisha Mckinnon DO (12/11 0051)   This study was ordered and independently reviewed by me    No acute findings noted             Procedures    ED Medication and Procedure Management   Prior to Admission Medications   Prescriptions Last Dose Informant Patient Reported? Taking?   Azelaic Acid 15 % cream   No No   Sig: Apply 1 application. topically 2 (two) times a day To affected area   Norethin-Eth Estrad-Fe Biphas (Lo Loestrin Fe) 1 MG-10 MCG / 10 MCG TABS   No No   Sig: Take 1 tablet by mouth daily   albuterol (ACCUNEB) 1.25 MG/3ML nebulizer solution   No No   Sig: Take 3 mL (1.25 mg total) by nebulization every 6 (six) hours as needed for wheezing   albuterol (PROVENTIL HFA,VENTOLIN HFA) 90 mcg/act inhaler   No No   Sig: Inhale 2 puffs every 6 (six) hours as needed for wheezing   cetirizine (ZyrTEC) 10 MG chewable tablet   Yes  No   Sig: Chew 10 mg daily   clotrimazole-betamethasone (LOTRISONE) 1-0.05 % cream   No No   Sig: Apply topically 2 (two) times a day   fluticasone (Flovent HFA) 110 MCG/ACT inhaler   No No   Sig: Inhale 2 puffs 2 (two) times a day Rinse mouth after use.   hydrocortisone 2.5 % ointment   No No   Sig: Apply topically 2 (two) times a day For 1-2 weeks until patch resolves. May restart for future flares.   tretinoin (RETIN-A) 0.025 % cream   No No   Sig: Apply pea sized amount to dry face at night (inactivated by sunlight)      Facility-Administered Medications: None     Patient's Medications   Discharge Prescriptions    PREDNISONE 20 MG TABLET    Take 2 tablets (40 mg total) by mouth daily       Start Date: 12/11/2024End Date: --       Order Dose: 40 mg       Quantity: 8 tablet    Refills: 0     No discharge procedures on file.  ED SEPSIS DOCUMENTATION   Time reflects when diagnosis was documented in both MDM as applicable and the Disposition within this note       Time User Action Codes Description Comment    12/11/2024  2:18 AM Nimisha Mckinnon [J45.901] Asthma exacerbation                  Nimisha Mckinnon DO  12/11/24 0219

## 2024-12-12 ENCOUNTER — VBI (OUTPATIENT)
Dept: FAMILY MEDICINE CLINIC | Facility: CLINIC | Age: 24
End: 2024-12-12

## 2024-12-12 LAB
ATRIAL RATE: 89 BPM
P AXIS: 52 DEGREES
PR INTERVAL: 118 MS
QRS AXIS: 75 DEGREES
QRSD INTERVAL: 76 MS
QT INTERVAL: 344 MS
QTC INTERVAL: 418 MS
T WAVE AXIS: 64 DEGREES
VENTRICULAR RATE: 89 BPM

## 2024-12-12 PROCEDURE — 93010 ELECTROCARDIOGRAM REPORT: CPT | Performed by: INTERNAL MEDICINE

## 2024-12-12 RX ORDER — ALBUTEROL SULFATE 90 UG/1
INHALANT RESPIRATORY (INHALATION)
Qty: 8.5 G | Refills: 0 | Status: SHIPPED | OUTPATIENT
Start: 2024-12-12

## 2024-12-12 NOTE — TELEPHONE ENCOUNTER
12/12/24 12:53 PM    Patient contacted post ED visit, [facility] department spoke with patient/caregiver and outreach was successful.    Thank you.  Lakia Camarena MA  [facility]

## 2024-12-12 NOTE — TELEPHONE ENCOUNTER
Patient needs an appointment. Please contact the patient to schedule an appointment. Last office visit: 1/12/23

## 2024-12-12 NOTE — TELEPHONE ENCOUNTER
12/12/24 9:33 AM    Patient contacted post ED visit, first outreach attempt made. Message was left for patient to return a call to the [facility] Department at [facility]: Phone [phone].    Thank you.  Lakia Camarena MA  [facility]

## 2024-12-12 NOTE — TELEPHONE ENCOUNTER
Patient called and was made aware that she needs to schedule an appointment in order to continue receiving refills. Patient is asking if a courtesy can be provided in the meantime for albuterol (ACCUNEB) 1.25 MG/3ML nebulizer solution.  Please review and reach out to Patient for a refill update.

## 2024-12-16 ENCOUNTER — OFFICE VISIT (OUTPATIENT)
Dept: FAMILY MEDICINE CLINIC | Facility: CLINIC | Age: 24
End: 2024-12-16
Payer: COMMERCIAL

## 2024-12-16 VITALS
WEIGHT: 172 LBS | HEART RATE: 76 BPM | OXYGEN SATURATION: 96 % | DIASTOLIC BLOOD PRESSURE: 70 MMHG | BODY MASS INDEX: 33.59 KG/M2 | SYSTOLIC BLOOD PRESSURE: 112 MMHG

## 2024-12-16 DIAGNOSIS — J45.30 MILD PERSISTENT ASTHMA WITHOUT COMPLICATION: ICD-10-CM

## 2024-12-16 PROCEDURE — 99213 OFFICE O/P EST LOW 20 MIN: CPT | Performed by: NURSE PRACTITIONER

## 2024-12-16 RX ORDER — FLUTICASONE PROPIONATE 110 UG/1
2 AEROSOL, METERED RESPIRATORY (INHALATION) 2 TIMES DAILY
Qty: 36 G | Refills: 1 | Status: SHIPPED | OUTPATIENT
Start: 2024-12-16

## 2024-12-16 RX ORDER — FLUTICASONE PROPIONATE 110 UG/1
2 AEROSOL, METERED RESPIRATORY (INHALATION) 2 TIMES DAILY
Qty: 12 G | Refills: 0 | Status: CANCELLED | OUTPATIENT
Start: 2024-12-16

## 2024-12-16 RX ORDER — ALBUTEROL SULFATE 1.25 MG/3ML
1 SOLUTION RESPIRATORY (INHALATION) EVERY 6 HOURS PRN
Qty: 90 ML | Refills: 0 | Status: SHIPPED | OUTPATIENT
Start: 2024-12-16

## 2024-12-16 RX ORDER — FLUTICASONE PROPIONATE 110 UG/1
2 AEROSOL, METERED RESPIRATORY (INHALATION) 2 TIMES DAILY
Qty: 12 G | Refills: 5 | Status: SHIPPED | OUTPATIENT
Start: 2024-12-16 | End: 2024-12-16

## 2024-12-16 NOTE — TELEPHONE ENCOUNTER
Not a duplicate  Patient needs script to be sent in for a 90 day supply.    Reason for call:   [x] Refill   [] Prior Auth  [] Other:     Office:   [x] PCP/Provider -  Mehdi Hernandez MD   [] Specialty/Provider -     Medication: fluticasone (Flovent HFA) 110 MCG/ACT inhaler     Dose/Frequency: Inhale 2 puffs 2 (two) times a day Rinse mouth after use.     Quantity: 36 g    Pharmacy: [facility] - [address]     Does the patient have enough for 3 days?   [x] Yes   [] No - Send as HP to POD

## 2024-12-16 NOTE — PROGRESS NOTES
Name: Karla Turk      : 2000      MRN: [id number]  Encounter Provider: ROSITA Hodges  Encounter Date: 2024   Encounter department: [facility]  :  Assessment & Plan  Mild persistent asthma without complication  Patient was seen in ED on 12/10/24 for exacerbation of asthma. She was prescribed a course of Prednisone which she finished today. She states she is doing much better. Denies shortness of breath. She needs a 3 month supple for Flovent [facility]. She also needs a refill for nebulizer solution.    Orders:    fluticasone (Flovent HFA) 110 MCG/ACT inhaler; Inhale 2 puffs 2 (two) times a day Rinse mouth after use.    albuterol (ACCUNEB) 1.25 MG/3ML nebulizer solution; Take 3 mL (1.25 mg total) by nebulization every 6 (six) hours as needed for wheezing           History of Present Illness Here for medication management for asthma.      Review of Systems   Constitutional:  Negative for activity change and fever.   Respiratory:  Negative for chest tightness, shortness of breath and wheezing.    Cardiovascular:  Negative for chest pain.   Skin:  Negative for color change and pallor.   Neurological:  Negative for dizziness and weakness.       Objective   /70 (BP Location: Left arm, Patient Position: Sitting, Cuff Size: Standard)   Pulse 76   Wt 78 kg (172 lb)   SpO2 96%   BMI 33.59 kg/m²      Physical Exam  Vitals and nursing note reviewed.   Constitutional:       General: She is not in acute distress.     Appearance: Normal appearance. She is not ill-appearing.   HENT:      Head: Normocephalic.   Eyes:      Extraocular Movements: Extraocular movements intact.   Cardiovascular:      Rate and Rhythm: Normal rate and regular rhythm.      Heart sounds: Normal heart sounds.   Pulmonary:      Effort: Pulmonary effort is normal. No respiratory distress.      Breath sounds: Normal breath sounds. No stridor. No wheezing, rhonchi or rales.    Chest:      Chest wall: No tenderness.   Musculoskeletal:         General: Normal range of motion.      Cervical back: Normal range of motion and neck supple.   Skin:     General: Skin is warm and dry.   Neurological:      Mental Status: She is alert and oriented to person, place, and time.   Psychiatric:         Mood and Affect: Mood normal.         Behavior: Behavior normal.

## 2025-01-06 DIAGNOSIS — J45.30 MILD PERSISTENT ASTHMA WITHOUT COMPLICATION: ICD-10-CM

## 2025-01-07 RX ORDER — ALBUTEROL SULFATE 90 UG/1
INHALANT RESPIRATORY (INHALATION)
Qty: 8.5 G | Refills: 5 | Status: SHIPPED | OUTPATIENT
Start: 2025-01-07